# Patient Record
Sex: MALE | Race: WHITE | NOT HISPANIC OR LATINO | Employment: FULL TIME | URBAN - METROPOLITAN AREA
[De-identification: names, ages, dates, MRNs, and addresses within clinical notes are randomized per-mention and may not be internally consistent; named-entity substitution may affect disease eponyms.]

---

## 2024-06-21 ENCOUNTER — OFFICE VISIT (OUTPATIENT)
Dept: ENDOCRINOLOGY | Facility: CLINIC | Age: 74
End: 2024-06-21
Payer: COMMERCIAL

## 2024-06-21 VITALS
BODY MASS INDEX: 32.18 KG/M2 | DIASTOLIC BLOOD PRESSURE: 78 MMHG | SYSTOLIC BLOOD PRESSURE: 140 MMHG | WEIGHT: 205 LBS | HEART RATE: 58 BPM | HEIGHT: 67 IN

## 2024-06-21 DIAGNOSIS — E03.2 HYPOTHYROIDISM DUE TO MEDICATION: Primary | ICD-10-CM

## 2024-06-21 LAB
ANION GAP SERPL CALCULATED.3IONS-SCNC: 10 MMOL/L (ref 5–15)
BUN SERPL-MCNC: 28 MG/DL (ref 8–23)
BUN/CREAT SERPL: 26.4 (ref 7–25)
CALCIUM SPEC-SCNC: 9.2 MG/DL (ref 8.6–10.5)
CHLORIDE SERPL-SCNC: 102 MMOL/L (ref 98–107)
CO2 SERPL-SCNC: 27 MMOL/L (ref 22–29)
CREAT SERPL-MCNC: 1.06 MG/DL (ref 0.76–1.27)
EGFRCR SERPLBLD CKD-EPI 2021: 74.6 ML/MIN/1.73
GLUCOSE SERPL-MCNC: 90 MG/DL (ref 65–99)
POTASSIUM SERPL-SCNC: 3.9 MMOL/L (ref 3.5–5.2)
SODIUM SERPL-SCNC: 139 MMOL/L (ref 136–145)
T4 FREE SERPL-MCNC: 1.67 NG/DL (ref 0.92–1.68)
TSH SERPL DL<=0.05 MIU/L-ACNC: 3.12 UIU/ML (ref 0.27–4.2)

## 2024-06-21 PROCEDURE — 84439 ASSAY OF FREE THYROXINE: CPT | Performed by: INTERNAL MEDICINE

## 2024-06-21 PROCEDURE — 84443 ASSAY THYROID STIM HORMONE: CPT | Performed by: INTERNAL MEDICINE

## 2024-06-21 PROCEDURE — 80048 BASIC METABOLIC PNL TOTAL CA: CPT | Performed by: INTERNAL MEDICINE

## 2024-06-21 RX ORDER — AMIODARONE HYDROCHLORIDE 200 MG/1
1 TABLET ORAL 2 TIMES DAILY
COMMUNITY

## 2024-06-21 RX ORDER — LEVOFLOXACIN 750 MG/1
1 TABLET, FILM COATED ORAL DAILY
COMMUNITY
Start: 2024-05-27

## 2024-06-21 RX ORDER — BENZONATATE 200 MG/1
CAPSULE ORAL
COMMUNITY

## 2024-06-21 RX ORDER — CETIRIZINE HYDROCHLORIDE 10 MG/1
CAPSULE, LIQUID FILLED ORAL
COMMUNITY

## 2024-06-21 RX ORDER — LEVOTHYROXINE SODIUM 0.07 MG/1
75 TABLET ORAL
COMMUNITY

## 2024-06-21 RX ORDER — ASPIRIN 81 MG/1
81 TABLET ORAL DAILY
COMMUNITY

## 2024-06-21 RX ORDER — SACUBITRIL AND VALSARTAN 24; 26 MG/1; MG/1
1 TABLET, FILM COATED ORAL 2 TIMES DAILY
COMMUNITY

## 2024-06-21 RX ORDER — FUROSEMIDE 20 MG/1
TABLET ORAL
COMMUNITY

## 2024-06-21 RX ORDER — APIXABAN 5 MG/1
1 TABLET, FILM COATED ORAL 2 TIMES DAILY
COMMUNITY
Start: 2024-05-23

## 2024-06-21 RX ORDER — ALBUTEROL SULFATE 90 UG/1
AEROSOL, METERED RESPIRATORY (INHALATION)
COMMUNITY

## 2024-06-21 NOTE — PROGRESS NOTES
Subjective:   Hypothyroidism        Pierre Church is a 74 y.o. male who is being seen for consultation today at the request of HORACIO Martin   Hypothyroidism  , amiodarone induced dx in 09/2023. He had Afib/CVA in 09/2023 and started amiodarone.    Cardioablation completed in 11/2024, in sinsus rhythm since then  C/o leg swelling, joint pain, rashes, fatigue and weigh gain.   He started levothyroxine in November after he presented with dizziness, fatigue, swelling. TSH was 12  He started 25 mcg levothyroxine and the dose slowly increased to 75 mcg about 4-6 weeks ago by neurologist.   He feels that some of the symptoms improve after the dose increase     Current medications:  Current Outpatient Medications   Medication Sig Dispense Refill    albuterol sulfate  (90 Base) MCG/ACT inhaler INHALE 1 PUFF BY MOUTH EVERY 4-6 HOURS FOR 10 DAYS AS NEEDED      amiodarone (PACERONE) 200 MG tablet Take 1 tablet by mouth 2 (Two) Times a Day.      aspirin 81 MG EC tablet Take 1 tablet by mouth Daily.      benzonatate (TESSALON) 200 MG capsule TAKE 1 CAPSULE BY MOUTH THREE TIMES A DAY FOR 10 DAYS      Cetirizine HCl (ZyrTEC Allergy) 10 MG capsule Take 1 capsule twice a day by oral route.      Eliquis 5 MG tablet tablet Take 1 tablet by mouth 2 (Two) Times a Day.      Entresto 24-26 MG tablet Take 1 tablet by mouth 2 (Two) Times a Day.      furosemide (LASIX) 20 MG tablet TAKE 1 TABLET BY MOUTH UP TO TWICE A DAY AS NEEDED      levoFLOXacin (LEVAQUIN) 750 MG tablet Take 1 tablet by mouth Daily.      levothyroxine (SYNTHROID, LEVOTHROID) 75 MCG tablet Take 1 tablet by mouth Every Morning Before Breakfast.      metoprolol tartrate (LOPRESSOR) 25 MG tablet Take 1 tablet by mouth 2 (Two) Times a Day.       No current facility-administered medications for this visit.       Review of Systems  Review of Systems   Constitutional:  Positive for fatigue.   Respiratory:  Positive for shortness of breath.    Cardiovascular:   Positive for palpitations.   Neurological:  Positive for weakness.   Psychiatric/Behavioral:  Positive for decreased concentration.        Objective:     Vitals:    06/21/24 1344   BP: 140/78   Pulse: 58   Body mass index is 32.11 kg/m².  Physical Exam  Vitals reviewed.   Constitutional:       Appearance: Normal appearance.   Cardiovascular:      Rate and Rhythm: Normal rate and regular rhythm.      Pulses: Normal pulses.      Heart sounds: Normal heart sounds.   Pulmonary:      Effort: Pulmonary effort is normal.      Breath sounds: Normal breath sounds.   Musculoskeletal:         General: No swelling.   Neurological:      Mental Status: He is alert and oriented to person, place, and time.   Psychiatric:         Mood and Affect: Mood normal.         Thought Content: Thought content normal.         LABS AND IMAGING  Lab Results   Component Value Date    TSH 6.930 (H) 12/10/2023    FREET4 0.92 12/10/2023               Assessment:        Problem List Items Addressed This Visit    None  Visit Diagnoses       Hypothyroidism due to medication    -  Primary    Relevant Medications    levothyroxine (SYNTHROID, LEVOTHROID) 75 MCG tablet    metoprolol tartrate (LOPRESSOR) 25 MG tablet    Other Relevant Orders    T4, Free    TSH    Basic Metabolic Panel                  Plan:         Patient appears clinically hypothyroid, the onset is associated with amiodarone.   The dose was slowly increased to a current 75 mcg daily.   Repeat the levels and adjust the dose.   Patient has asked if [place] could be considered, I explained with hx of AFib and heart condition it is risky to take this medication.      Return in about 4 months (around 10/21/2024) for 15 min appointment.   The risks and benefits of my recommendations, as well as other treatment options were discussed with the patient today. Questions were answered.         Nallely Paredes MD

## 2024-06-25 ENCOUNTER — TELEPHONE (OUTPATIENT)
Dept: ENDOCRINOLOGY | Facility: CLINIC | Age: 74
End: 2024-06-25
Payer: COMMERCIAL

## 2024-06-25 NOTE — TELEPHONE ENCOUNTER
Caller: Pierre Church    Relationship to patient: Self    Best call back number: [phone]    Patient is needing: PT WOULD LIKE TO FIND OUT IF HE CAN DRINK A SPRITE THAT SAYS ZERO SUGAR IN IT OR SHOULD HE NOT DRINK IT ALL TOGETHER?? PLEASE CALL THE PT BACK.

## 2024-06-25 NOTE — TELEPHONE ENCOUNTER
Caller: RanjitPierre trujillo    Relationship: Self    Best call back number: [phone]    Requested Prescriptions: levothyroxine (SYNTHROID, LEVOTHROID) 75 MCG tablet   Requested Prescriptions      No prescriptions requested or ordered in this encounter        Pharmacy where request should be sent: [facility]- [address] [place] [address] [address] [place], [place]     Last office visit with prescribing clinician: 6/21/2024   Last telemedicine visit with prescribing clinician: Visit date not found   Next office visit with prescribing clinician: 10/11/2024     Additional details provided by patient: PT NEEDS A REFILL ON THIS MEDICATION.     Does the patient have less than a 3 day supply:  [] Yes  [x] No    Would you like a call back once the refill request has been completed: [x] Yes [] No    If the office needs to give you a call back, can they leave a voicemail: [x] Yes [] No    Miguel Mendez Rep   06/25/24 10:09 EDT

## 2024-06-26 RX ORDER — LEVOTHYROXINE SODIUM 0.07 MG/1
75 TABLET ORAL
Qty: 30 TABLET | Refills: 1 | Status: SHIPPED | OUTPATIENT
Start: 2024-06-26 | End: 2024-06-27 | Stop reason: SDUPTHER

## 2024-06-26 NOTE — TELEPHONE ENCOUNTER
Rx Refill Note  Requested Prescriptions      No prescriptions requested or ordered in this encounter      Last office visit with prescribing clinician: 6/21/2024   Last telemedicine visit with prescribing clinician: Visit date not found   Next office visit with prescribing clinician: 10/11/2024                         Would you like a call back once the refill request has been completed: [] Yes [] No    If the office needs to give you a call back, can they leave a voicemail: [] Yes [] No    Vicky Fine MA  06/26/24, 11:41 EDT

## 2024-06-27 ENCOUNTER — TELEPHONE (OUTPATIENT)
Dept: ENDOCRINOLOGY | Facility: CLINIC | Age: 74
End: 2024-06-27
Payer: COMMERCIAL

## 2024-06-27 NOTE — TELEPHONE ENCOUNTER
Rx Refill Note  Requested Prescriptions     Pending Prescriptions Disp Refills    levothyroxine (SYNTHROID, LEVOTHROID) 75 MCG tablet 30 tablet 1     Sig: Take 1 tablet by mouth Every Morning Before Breakfast.      Last office visit with prescribing clinician: 6/21/2024   Last telemedicine visit with prescribing clinician: Visit date not found   Next office visit with prescribing clinician: 10/11/2024                         Would you like a call back once the refill request has been completed: [] Yes [] No    If the office needs to give you a call back, can they leave a voicemail: [] Yes [] No    Vicky Fine MA  06/27/24, 16:00 EDT

## 2024-06-27 NOTE — TELEPHONE ENCOUNTER
Caller: Pierre Church    Relationship: Self    Best call back number: [phone]     Requested Prescriptions:   Requested Prescriptions      No prescriptions requested or ordered in this encounter    LEVOTHRYOXINE 90 DAY    Pharmacy where request should be sent:    [facility]  [place] [place]  Last office visit with prescribing clinician: 6/21/2024     Next office visit with prescribing clinician: 10/11/2024     Does the patient have less than a 3 day supply:  [x] Yes  [] No    Would you like a call back once the refill request has been completed: [x] Yes [] No    If the office needs to give you a call back, can they leave a voicemail: [x] Yes [] No    Miguel Nicole   06/27/24 15:35 EDT

## 2024-06-28 ENCOUNTER — TELEPHONE (OUTPATIENT)
Dept: ENDOCRINOLOGY | Facility: CLINIC | Age: 74
End: 2024-06-28
Payer: COMMERCIAL

## 2024-06-28 RX ORDER — LEVOTHYROXINE SODIUM 0.07 MG/1
75 TABLET ORAL
Qty: 30 TABLET | Refills: 1 | Status: SHIPPED | OUTPATIENT
Start: 2024-06-28 | End: 2024-06-28 | Stop reason: SDUPTHER

## 2024-06-28 NOTE — TELEPHONE ENCOUNTER
Rx Refill Note  Requested Prescriptions     Pending Prescriptions Disp Refills    levothyroxine (SYNTHROID, LEVOTHROID) 75 MCG tablet 90 tablet 1     Sig: Take 1 tablet by mouth Every Morning Before Breakfast.      Last office visit with prescribing clinician: 6/21/2024   Last telemedicine visit with prescribing clinician: Visit date not found   Next office visit with prescribing clinician: 10/11/2024                         Would you like a call back once the refill request has been completed: [] Yes [] No    If the office needs to give you a call back, can they leave a voicemail: [] Yes [] No    Vicky Fine MA  06/28/24, 15:50 EDT

## 2024-06-28 NOTE — TELEPHONE ENCOUNTER
PT'S DAUGHTER CALLED REQUESTING TO INCREASE THIS PT'S THYROID MEDICATION DUE TO HIM NOT FEELING WELL. SHE REQUESTED A CALL BACK TO CONSULT.

## 2024-06-28 NOTE — TELEPHONE ENCOUNTER
ALTERNATIVE REQUESTED  Drug: LEVOTHYROXINE 75 MCG TAB  Qty: 30  Directions: Take 1 tablet by mouth every day before breakfast  Date Written: 06/26/24  Pharmacy Comments: Medication needs to be dispensed as a 90 day supply per insurance requirements. Please provide additional refills if appropriate.     [facility]/pharmacy #[id number] - [place] [id number] [facility] [phone] [facility] [phone]

## 2024-07-01 RX ORDER — LEVOTHYROXINE SODIUM 0.07 MG/1
75 TABLET ORAL
Qty: 90 TABLET | Refills: 1 | Status: SHIPPED | OUTPATIENT
Start: 2024-07-01

## 2024-07-01 NOTE — TELEPHONE ENCOUNTER
Called and spoke with pt daughter and she thinks if he is tweaked just a little he might feel better. I let her know that his labs are normal

## 2024-07-18 ENCOUNTER — APPOINTMENT (OUTPATIENT)
Dept: GENERAL RADIOLOGY | Facility: HOSPITAL | Age: 74
End: 2024-07-18
Payer: COMMERCIAL

## 2024-07-18 ENCOUNTER — APPOINTMENT (OUTPATIENT)
Dept: NEUROLOGY | Facility: HOSPITAL | Age: 74
End: 2024-07-18
Payer: COMMERCIAL

## 2024-07-18 ENCOUNTER — DOCUMENTATION (OUTPATIENT)
Dept: CARDIOLOGY | Facility: CLINIC | Age: 74
End: 2024-07-18
Payer: COMMERCIAL

## 2024-07-18 ENCOUNTER — APPOINTMENT (OUTPATIENT)
Dept: CT IMAGING | Facility: HOSPITAL | Age: 74
End: 2024-07-18
Payer: COMMERCIAL

## 2024-07-18 ENCOUNTER — HOSPITAL ENCOUNTER (INPATIENT)
Facility: HOSPITAL | Age: 74
LOS: 2 days | Discharge: HOME | End: 2024-07-20
Attending: INTERNAL MEDICINE | Admitting: INTERNAL MEDICINE
Payer: COMMERCIAL

## 2024-07-18 DIAGNOSIS — R41.841 COGNITIVE COMMUNICATION DEFICIT: Primary | ICD-10-CM

## 2024-07-18 PROBLEM — I63.9 ACUTE CVA (CEREBROVASCULAR ACCIDENT): Status: ACTIVE | Noted: 2024-07-18

## 2024-07-18 PROBLEM — G93.40 ACUTE ENCEPHALOPATHY: Status: ACTIVE | Noted: 2024-07-18

## 2024-07-18 LAB
AMPHET+METHAMPHET UR QL: NEGATIVE
AMPHETAMINES UR QL: NEGATIVE
BARBITURATES UR QL SCN: NEGATIVE
BENZODIAZ UR QL SCN: NEGATIVE
BILIRUB UR QL STRIP: NEGATIVE
BUPRENORPHINE SERPL-MCNC: NEGATIVE NG/ML
CANNABINOIDS SERPL QL: NEGATIVE
CLARITY UR: CLEAR
COCAINE UR QL: NEGATIVE
COLOR UR: YELLOW
FENTANYL UR-MCNC: NEGATIVE NG/ML
GLUCOSE BLDC GLUCOMTR-MCNC: 122 MG/DL (ref 70–130)
GLUCOSE BLDC GLUCOMTR-MCNC: 98 MG/DL (ref 70–130)
GLUCOSE UR STRIP-MCNC: NEGATIVE MG/DL
HGB UR QL STRIP.AUTO: NEGATIVE
KETONES UR QL STRIP: NEGATIVE
LEUKOCYTE ESTERASE UR QL STRIP.AUTO: NEGATIVE
METHADONE UR QL SCN: NEGATIVE
NITRITE UR QL STRIP: NEGATIVE
OPIATES UR QL: NEGATIVE
OXYCODONE UR QL SCN: NEGATIVE
PCP UR QL SCN: NEGATIVE
PH UR STRIP.AUTO: 7.5 [PH] (ref 5–8)
POTASSIUM SERPL-SCNC: 3.8 MMOL/L (ref 3.5–5.2)
PROT UR QL STRIP: NEGATIVE
SP GR UR STRIP: 1.04 (ref 1–1.03)
TRICYCLICS UR QL SCN: NEGATIVE
TSH SERPL DL<=0.05 MIU/L-ACNC: 2.7 UIU/ML (ref 0.27–4.2)
UROBILINOGEN UR QL STRIP: ABNORMAL

## 2024-07-18 PROCEDURE — 70496 CT ANGIOGRAPHY HEAD: CPT

## 2024-07-18 PROCEDURE — 99222 1ST HOSP IP/OBS MODERATE 55: CPT | Performed by: NURSE PRACTITIONER

## 2024-07-18 PROCEDURE — 25510000001 IOPAMIDOL PER 1 ML: Performed by: INTERNAL MEDICINE

## 2024-07-18 PROCEDURE — 81003 URINALYSIS AUTO W/O SCOPE: CPT | Performed by: INTERNAL MEDICINE

## 2024-07-18 PROCEDURE — 82948 REAGENT STRIP/BLOOD GLUCOSE: CPT

## 2024-07-18 PROCEDURE — 80307 DRUG TEST PRSMV CHEM ANLYZR: CPT | Performed by: INTERNAL MEDICINE

## 2024-07-18 PROCEDURE — 93010 ELECTROCARDIOGRAM REPORT: CPT | Performed by: INTERNAL MEDICINE

## 2024-07-18 PROCEDURE — 99222 1ST HOSP IP/OBS MODERATE 55: CPT | Performed by: INTERNAL MEDICINE

## 2024-07-18 PROCEDURE — 71045 X-RAY EXAM CHEST 1 VIEW: CPT

## 2024-07-18 PROCEDURE — 95819 EEG AWAKE AND ASLEEP: CPT

## 2024-07-18 PROCEDURE — 70450 CT HEAD/BRAIN W/O DYE: CPT

## 2024-07-18 PROCEDURE — 4A03X5D MEASUREMENT OF ARTERIAL FLOW, INTRACRANIAL, EXTERNAL APPROACH: ICD-10-PCS | Performed by: RADIOLOGY

## 2024-07-18 PROCEDURE — 84443 ASSAY THYROID STIM HORMONE: CPT | Performed by: INTERNAL MEDICINE

## 2024-07-18 PROCEDURE — 95819 EEG AWAKE AND ASLEEP: CPT | Performed by: PSYCHIATRY & NEUROLOGY

## 2024-07-18 PROCEDURE — 84132 ASSAY OF SERUM POTASSIUM: CPT | Performed by: INTERNAL MEDICINE

## 2024-07-18 PROCEDURE — 92523 SPEECH SOUND LANG COMPREHEN: CPT

## 2024-07-18 PROCEDURE — 70498 CT ANGIOGRAPHY NECK: CPT

## 2024-07-18 PROCEDURE — 0042T HC CT CEREBRAL PERFUSION W/WO CONTRAST: CPT

## 2024-07-18 PROCEDURE — 93005 ELECTROCARDIOGRAM TRACING: CPT | Performed by: INTERNAL MEDICINE

## 2024-07-18 PROCEDURE — 25010000002 LEVETRIRACETAM PER 10 MG: Performed by: NURSE PRACTITIONER

## 2024-07-18 RX ORDER — MONTELUKAST SODIUM 10 MG/1
10 TABLET ORAL 2 TIMES DAILY
COMMUNITY

## 2024-07-18 RX ORDER — FLUTICASONE PROPIONATE 50 MCG
2 SPRAY, SUSPENSION (ML) NASAL DAILY
COMMUNITY

## 2024-07-18 RX ORDER — SODIUM CHLORIDE 0.9 % (FLUSH) 0.9 %
10 SYRINGE (ML) INJECTION AS NEEDED
Status: DISCONTINUED | OUTPATIENT
Start: 2024-07-18 | End: 2024-07-20 | Stop reason: HOSPADM

## 2024-07-18 RX ORDER — ASPIRIN 300 MG/1
300 SUPPOSITORY RECTAL DAILY
Status: DISCONTINUED | OUTPATIENT
Start: 2024-07-18 | End: 2024-07-20 | Stop reason: HOSPADM

## 2024-07-18 RX ORDER — AMOXICILLIN 500 MG
1200 CAPSULE ORAL DAILY
COMMUNITY
End: 2024-07-20 | Stop reason: HOSPADM

## 2024-07-18 RX ORDER — LEVETIRACETAM 500 MG/5ML
1000 INJECTION, SOLUTION, CONCENTRATE INTRAVENOUS ONCE
Status: COMPLETED | OUTPATIENT
Start: 2024-07-18 | End: 2024-07-18

## 2024-07-18 RX ORDER — SODIUM CHLORIDE 9 MG/ML
40 INJECTION, SOLUTION INTRAVENOUS AS NEEDED
Status: DISCONTINUED | OUTPATIENT
Start: 2024-07-18 | End: 2024-07-20 | Stop reason: HOSPADM

## 2024-07-18 RX ORDER — ASPIRIN 81 MG/1
81 TABLET, CHEWABLE ORAL DAILY
Status: DISCONTINUED | OUTPATIENT
Start: 2024-07-18 | End: 2024-07-20 | Stop reason: HOSPADM

## 2024-07-18 RX ORDER — ATORVASTATIN CALCIUM 40 MG/1
80 TABLET, FILM COATED ORAL NIGHTLY
Status: DISCONTINUED | OUTPATIENT
Start: 2024-07-18 | End: 2024-07-20 | Stop reason: HOSPADM

## 2024-07-18 RX ORDER — SODIUM CHLORIDE 0.9 % (FLUSH) 0.9 %
10 SYRINGE (ML) INJECTION EVERY 12 HOURS SCHEDULED
Status: DISCONTINUED | OUTPATIENT
Start: 2024-07-18 | End: 2024-07-20 | Stop reason: HOSPADM

## 2024-07-18 RX ORDER — LEVETIRACETAM 500 MG/5ML
500 INJECTION, SOLUTION, CONCENTRATE INTRAVENOUS EVERY 12 HOURS SCHEDULED
Status: DISCONTINUED | OUTPATIENT
Start: 2024-07-18 | End: 2024-07-20 | Stop reason: HOSPADM

## 2024-07-18 RX ADMIN — ATORVASTATIN CALCIUM 80 MG: 40 TABLET, FILM COATED ORAL at 20:46

## 2024-07-18 RX ADMIN — Medication 10 ML: at 20:47

## 2024-07-18 RX ADMIN — IOPAMIDOL 120 ML: 755 INJECTION, SOLUTION INTRAVENOUS at 13:21

## 2024-07-18 RX ADMIN — Medication 10 ML: at 15:57

## 2024-07-18 RX ADMIN — LEVETIRACETAM 500 MG: 100 INJECTION, SOLUTION INTRAVENOUS at 20:47

## 2024-07-18 RX ADMIN — APIXABAN 5 MG: 5 TABLET, FILM COATED ORAL at 20:46

## 2024-07-18 RX ADMIN — LEVETIRACETAM 1000 MG: 100 INJECTION, SOLUTION INTRAVENOUS at 15:56

## 2024-07-18 NOTE — PROGRESS NOTES
Medtronic defibrillator [id number] Pedro is not MRI compatible.  RA lead 5076-52, RV 6947M are compatible.

## 2024-07-18 NOTE — THERAPY EVALUATION
Acute Care - Speech Language Pathology   Initial Evaluation  [facility]  Cognitive-Communication Evaluation       Patient Name: Pierre Church  : 1950  MRN: [id number]  Today's Date: 2024               Admit Date: 2024     Visit Dx:    ICD-10-CM ICD-9-CM   1. Cognitive communication deficit  R41.841 799.52     Patient Active Problem List   Diagnosis    Acute CVA (cerebrovascular accident)    Acute encephalopathy     Past Medical History:   Diagnosis Date    Arthritis     CHF (congestive heart failure)     Coronary artery disease     Disease of thyroid gland     Elevated cholesterol     Hypertension     Stroke      Past Surgical History:   Procedure Laterality Date    CARDIAC CATHETERIZATION      CARDIAC SURGERY      CORONARY ARTERY BYPASS GRAFT      HERNIA REPAIR         SLP Recommendation and Plan  SLP Diagnosis: functional speech/language skills, mild, cognitive-linguistic disorder (24)              [facility] Criteria for Skilled Therapy Interventions Met: yes (24)  Anticipated Discharge Disposition (SLP): home with OP services, anticipate therapy at next level of care (24)        Therapy Frequency (SLP SLC): 5 days per week (24)  Predicted Duration Therapy Intervention (Days): 1 week (24)                                    SLP EVALUATION (Last 72 Hours)       SLP SLC Evaluation       Row Name 24                   Communication Assessment/Intervention    Document Type evaluation  -CH        Subjective Information no complaints  -CH        Patient Observations alert;cooperative;agree to therapy  -CH        Patient/Family/Caregiver Comments/Observations family present, patient emotionally labile per family report  -CH        Patient Effort good  -CH        Symptoms Noted During/After Treatment none  -CH           General Information    Patient Profile Reviewed yes  -CH        Pertinent History Of Current Problem Hx prior CVA with a  right PCA stroke in September 2023 with hemorrhagic conversion, A-fib, MI, CABG, hypertension and dyslipidemia.  Admitted with confusion/AMS, then developed left-sided weakness involving face arm and leg as well as facial droop and slurred speech.  -        Precautions/Limitations, Vision WFL;for purposes of eval  -CH        Precautions/Limitations, Hearing WFL;for purposes of eval  -CH        Prior Level of Function-Communication WFL  -        Plans/Goals Discussed with patient and family;agreed upon  -        Barriers to Rehab none identified  -        Patient's Goals for Discharge patient did not state  -        Family Goals for Discharge patient able to return to previous activities/roles  -           Pain    Additional Documentation Pain Scale: FACES Pre/Post-Treatment (Group)  -           Pain Scale: FACES Pre/Post-Treatment    Pain: FACES Scale, Pretreatment 0-->no hurt  -        Posttreatment Pain Rating 0-->no hurt  -           Comprehension Assessment/Intervention    Comprehension Assessment/Intervention Auditory Comprehension;Reading Comprehension  -           Auditory Comprehension Assessment/Intervention    Auditory Comprehension (Communication) WNL  -CH           Reading Comprehension Assessment/Intervention    Reading Comprehension (Communication) WNL  -           Expression Assessment/Intervention    Expression Assessment/Intervention verbal expression  -           Verbal Expression Assessment/Intervention    Verbal Expression WNL  -        Automatic Speech (Communication) response to greeting;WNL  -CH        Repetition sentences;WNL  -CH        Responsive Naming complex;WNL  -CH        Confrontational Naming low frequency;WNL  -CH        Spontaneous/Functional Words complex;WNL  -CH        Sentence Formulation complex;WNL  -CH        Conversational Discourse/Fluency WNL  -           Oral Motor Structure and Function    Oral Motor Structure and Function WFL  -CH            Oral Musculature and Cranial Nerve Assessment    Oral Motor General Assessment WFL  -           Motor Speech Assessment/Intervention    Motor Speech Function WNL  -           Cursory Voice Assessment/Intervention    Quality and Resonance (Voice) WNL  -           Cognitive Assessment Intervention- SLP    Cognitive Function (Cognition) mild impairment  -        Orientation Status (Cognition) awareness of basic personal information;person;WFL;place;time;situation;mild impairment  -        Memory (Cognitive) immediate;simple;long-term;WFL;short-term;delayed;mental manipulation;mild impairment  -        Attention (Cognitive) selective;sustained;attention to detail;[facility]  -        Thought Organization (Cognitive) concrete divergent;abstract divergent;concrete convergent;abstract convergent;[facility]  -        Reasoning (Cognitive) simple;deductive;[facility]  -        Problem Solving (Cognitive) simple;temporal;[facility]  -        Functional Math (Cognitive) simple;word problems;[facility]  -        Pragmatics (Communication) [facility]  -           SLP Evaluation Clinical Impressions    SLP Diagnosis functional speech/language skills;mild;cognitive-linguistic disorder  -        Rehab Potential/Prognosis good  -        SLC Criteria for Skilled Therapy Interventions Met yes  -           Recommendations    Therapy Frequency (SLP SLC) 5 days per week  -        Predicted Duration Therapy Intervention (Days) 1 week  -        Anticipated Discharge Disposition (SLP) home with OP services;anticipate therapy at next level of care  -                  User Key  (r) = Recorded By, (t) = Taken By, (c) = Cosigned By      Initials Name Effective Dates    Dianna Walker, MS CCC-SLP 06/16/21 -                        EDUCATION  The patient has been educated in the following areas:     Cognitive Impairment Communication Impairment.           SLP GOALS       Row Name 07/18/24 1728             Patient will demonstrate functional  cognitive-linguistic skills for return to discharge environment    [place] with minimal cues  -      Time frame by discharge  -      Progress/Outcomes new goal  -         Awareness of Basic Personal Information Goal 1 (SLP)    Improve Awareness of Basic Personal Information Goal 1 (SLP) answering open-ended questions regarding personal/biographical information;80%;with minimal cues (75-90%)  -      Time Frame (Awareness of Basic Personal Information Goal 1, SLP) 1 week  -      Progress/Outcomes (Awareness of Basic Personal Information Goal 1, SLP) new goal  -         Orientation Goal 1 (SLP)    Improve Orientation Through Goal 1 (SLP) demonstrating orientation to day;demonstrating orientation to month;demonstrating orientation to year;demonstrating orientation to place;demonstrating orientation to disease/impairment;use environmental aids to assist with orientation;90%;with minimal cues (75-90%)  -      Time Frame (Orientation Goal 1, SLP) 1 week  -      Progress/Outcomes (Orientation Goal 1, SLP) new goal  -         Memory Skills Goal 1 (SLP)    Improve Memory Skills Through Goal 1 (SLP) recalling related word lists immediately;recalling related word lists with an imposed delay;recall details from a word list;repeat list in sequential order;recall details of the day;80%;with minimal cues (75-90%)  -      Time Frame (Memory Skills Goal 1, SLP) 1 week  -      Progress/Outcomes (Memory Skills Goal 1, SLP) new goal  -                User Key  (r) = Recorded By, (t) = Taken By, (c) = Cosigned By      Initials Name Provider Type    Dianna Walker MS CCC-SLP Speech and Language Pathologist                              Time Calculation:      Time Calculation- SLP       Row Name 07/18/24 8318             Time Calculation- SLP    SLP Start Time 1430  -      SLP Received On 07/18/24  -         Untimed Charges    SLP Eval/Re-eval  ST Eval Speech and Production w/ Language - 19128  -       [id number] Eval Speech and Production w/ Language Minutes 48  -CH         Total Minutes    Untimed Charges Total Minutes 48  -CH       Total Minutes 48  -CH                User Key  (r) = Recorded By, (t) = Taken By, (c) = Cosigned By      Initials Name Provider Type    Dianna Walker MS CCC-SLP Speech and Language Pathologist                    Therapy Charges for Today       Code Description Service Date Service Provider Modifiers Qty    [id number] HC ST EVAL SPEECH AND PROD W LANG  3 7/18/2024 Dianna Duarte MS CCC-SLP GN 1                       Dianna Duarte MS CCC-SLP  7/18/2024

## 2024-07-18 NOTE — PLAN OF CARE
Goal Outcome Evaluation:  Plan of Care Reviewed With: patient, family                  Anticipated Discharge Disposition (SLP): home with OP services, anticipate therapy at next level of care    SLP Diagnosis: functional speech/language skills, mild, cognitive-linguistic disorder (07/18/24 7290)

## 2024-07-18 NOTE — CONSULTS
Stroke Consult Note    Patient Name: Pierre Church   MRN: [id number]  Age: 74 y.o.  Sex: male  : 1950    Primary Care Physician: Josesito Trejo MD  Referring Physician: Dr. Dai      Handedness: Right  Race:     Chief Complaint/Reason for Consultation: Left-sided weakness, left facial droop, dysarthria    Subjective .  HPI: Pierre Chruch is a 74-year-old,  male with known diagnosis of prior CVA (right PCA stroke, 2023; s/p thrombolytics with hemorrhagic conversion, residual left hemianopsia), A-fib (on Eliquis), MI, CABG () AICD, HTN, and HLD who presents as a transfer from [facility].  Patient had a sudden onset of altered mental status beginning at about 1045 this morning.  His wife stated that he was previously at his baseline and then he started this repetitively asking what day it was.  She denies any abnormal movements or other complaints.  He was initially just confused when she brought him to the emergency department but he then developed left-sided weakness involving the face, arm and leg as well as facial droop, sensory loss and slurred speech.  Per OSH ED provider NIHSS estimated at an 8.  [facility] was without CT capabilities; patient was transferred to [facility] for further stroke evaluation and possible LVO.    Upon arrival to [facility] patient escorted directly to CT scan.  CT head is negative for acute abnormalities, he has a chronic right PCA infarct, no evidence of hemorrhage.  CT perfusion is negative.  CTA head and neck negative for flow-limiting stenosis, no LVO.  Patient remains somewhat altered.  He is repetitively asking questions unable to tell me his age the month or his location.  He states he does not remember the events of this morning.  Gaze is normal, he does have some nystagmus with left lateral gaze, he has baseline left hemianopsia.  Face is symmetrical, no drift in any extremity.  Sensation is equal to light  touch bilaterally no dysmetria with finger-to-nose.  Patient did have an episode of vomiting when transferring to his room from CT scan.  He has no seizure history.    Last Known Normal Date/Time: 1045 EST     Review of Systems   Constitutional:  Positive for fatigue. Negative for fever.   Eyes:  Positive for visual disturbance.   Respiratory:  Negative for cough and shortness of breath.    Cardiovascular:  Negative for chest pain and palpitations.   Gastrointestinal:  Positive for nausea and vomiting.   Skin: Negative.    Neurological:  Negative for facial asymmetry, speech difficulty, weakness, numbness and headaches.   Psychiatric/Behavioral:  Positive for confusion.       No past medical history on file.  No past surgical history on file.  No family history on file.  Social History     Socioeconomic History    Marital status:    Tobacco Use    Smoking status: Never     Passive exposure: Never   Vaping Use    Vaping status: Never Used   Substance and Sexual Activity    Alcohol use: Never    Drug use: Defer    Sexual activity: Defer     Allergies   Allergen Reactions    Sulfate Rash     Prior to Admission medications    Medication Sig Start Date End Date Taking? Authorizing Provider   albuterol sulfate  (90 Base) MCG/ACT inhaler INHALE 1 PUFF BY MOUTH EVERY 4-6 HOURS FOR 10 DAYS AS NEEDED    Ru Zamora MD   amiodarone (PACERONE) 200 MG tablet Take 1 tablet by mouth 2 (Two) Times a Day.    Ru Zamora MD   aspirin 81 MG EC tablet Take 1 tablet by mouth Daily.    Ru Zamora MD   benzonatate (TESSALON) 200 MG capsule TAKE 1 CAPSULE BY MOUTH THREE TIMES A DAY FOR 10 DAYS    Ru Zamora MD   Cetirizine HCl (ZyrTEC Allergy) 10 MG capsule Take 1 capsule twice a day by oral route.    Ru Zamora MD   Eliquis 5 MG tablet tablet Take 1 tablet by mouth 2 (Two) Times a Day. 5/23/24   Ru Zamora MD   Entresto 24-26 MG tablet Take 1 tablet by mouth 2  (Two) Times a Day.    Ru Zamora MD   furosemide (LASIX) 20 MG tablet TAKE 1 TABLET BY MOUTH UP TO TWICE A DAY AS NEEDED    Ru Zamora MD   levoFLOXacin (LEVAQUIN) 750 MG tablet Take 1 tablet by mouth Daily. 5/27/24   Ru Zamora MD   levothyroxine (SYNTHROID, LEVOTHROID) 75 MCG tablet Take 1 tablet by mouth Every Morning Before Breakfast. 7/1/24   Nallely Paredes MD   metoprolol tartrate (LOPRESSOR) 25 MG tablet Take 1 tablet by mouth 2 (Two) Times a Day. 12/27/23   Ru Zamora MD             Objective     Heart Rate:  [70-73] 70  BP: (111-159)/() 111/79  Neurological Exam  Mental Status  Alert. Oriented only to person. Unable to state age, month, or location. Speech is normal. Language is fluent with no aphasia.    Cranial Nerves  CN II: Left homonymous hemianopsia.  CN III, IV, VI: Nystagmus present: Normal lids and orbits bilaterally. Pupils equal round and reactive to light bilaterally.  CN V: Facial sensation is normal.  CN VII: Full and symmetric facial movement.  CN XII: Tongue midline without atrophy or fasciculations.    Motor  Normal muscle bulk throughout. No fasciculations present. Normal muscle tone. No abnormal involuntary movements. Strength is 5/5 throughout all four extremities.    Sensory  Light touch is normal in upper and lower extremities.     Coordination  Right: Finger-to-nose normal. Heel-to-shin normal.Left: Finger-to-nose normal. Heel-to-shin normal.    Gait    Not tested.      Physical Exam  Vitals reviewed.   Constitutional:       Appearance: Normal appearance.   HENT:      Head: Normocephalic and atraumatic.   Eyes:      General: Lids are normal. Visual field deficit present.      Extraocular Movements: Nystagmus present.      Pupils: Pupils are equal, round, and reactive to light.   Cardiovascular:      Rate and Rhythm: Normal rate.   Pulmonary:      Effort: Pulmonary effort is normal. No respiratory distress.   Musculoskeletal:          General: No swelling. Normal range of motion.      Cervical back: Normal range of motion and neck supple.   Skin:     General: Skin is warm and dry.   Neurological:      Mental Status: He is alert. He is disoriented.      Cranial Nerves: Cranial nerve deficit present.      Sensory: No sensory deficit.      Motor: Motor strength is normal.No weakness.      Coordination: Coordination normal.   Psychiatric:         Mood and Affect: Mood normal.         Speech: Speech normal.         Behavior: Behavior normal.         Acute Stroke Data    IV Thrombolytic (TPA/Tenecteplase) Inclusion / Exclusion Criteria    Time: 14:57 EDT  Person Administering Scale: HORACIO Pryor    Inclusion Criteria  [x]   18 years of age or greater   [x]   Onset of symptoms < 4.5 hours before beginning treatment (stroke onset = time patient was last seen well or without symptoms).   []   Diagnosis of acute ischemic stroke causing measurable disabling deficit (Complete Hemianopia, Any Aphasia, Visual or Sensory Extinction, Any weakness limiting sustained effort against gravity)   []   Any remaining deficit considered potentially disabling in view of patient and practitioner   Exclusion criteria (Do not proceed with Alteplase if any are checked under exclusion criteria)  []   Onset unknown or GREATER than 4.5 hours   []   ICH on CT/MRI   []   CT demonstrates hypodensity representing acute or subacute infarct   []   Significant head trauma or prior stroke in the previous 3 months   []   Symptoms suggestive of subarachnoid hemorrhage   []   History of un-ruptured intracranial aneurysm GREATER than 10 mm   []   Recent intracranial or intraspinal surgery within the last 3 months   []   Arterial puncture at a non-compressible site in the previous 7 days   []   Active internal bleeding   []   Acute bleeding tendency   []   Platelet count LESS than 100,000 for known hematological diseases such as leukemia, thrombocytopenia or chronic cirrhosis   []    Current use of anticoagulant with INR GREATER than 1.7 or PT GREATER than 15 seconds, aPTT GREATER than 40 seconds   []   Heparin received within 48 hours, resulting in abnormally elevated aPTT GREATER than upper limit of normal   []   Current use of direct thrombin inhibitors or direct factor Xa inhibitors in the past 48 hours   []   Elevated blood pressure refractory to treatment (systolic GREATER than 185 mm/Hg or diastolic  GREATER than 110 mm/Hg   []   Suspected infective endocarditis and aortic arch dissection   []   Current use of therapeutic treatment dose of low-molecular-weight heparin (LMWH) within the previous 24 hours   []   Structural GI malignancy or bleed   Relative exclusion for all patients  [x]   Only minor nondisabling symptoms   []   Pregnancy   []   Seizure at onset with postictal residual neurological impairments   []   Major surgery or previous trauma within past 14 days   []   History of previous spontaneous ICH, intracranial neoplasm, or AV malformation   []   Postpartum (within previous 14 days)   []   Recent GI or urinary tract hemorrhage (within previous 21 days)   []   Recent acute MI (within previous 3 months)   []   History of unruptured intracranial aneurysm LESS than 10 mm   []   History of ruptured intracranial aneurysm   []   Blood glucose LESS than 50 mg/dL (2.7 mmol/L)   []   Dural puncture within the last 7 days   []   Known GREATER than 10 cerebral microbleeds   Additional exclusions for patients with symptoms onset between 3 and 4.5 hours.  []   Age > 80.   []   On any anticoagulants regardless of INR  >>> Warfarin (Coumadin), Heparin, Enoxaparin (Lovenox), fondaparinux (Arixtra), bivalirudin (Angiomax), Argatroban, dabigatran (Pradaxa), rivaroxaban (Xarelto), or apixaban (Eliquis)   []   Severe stroke (NIHSS > 25).   []   History of BOTH diabetes and previous ischemic stroke.   []   The risks and benefits have been discussed with the patient or family related to the  administration of IV alteplase for stroke symptoms.   []   I have discussed and reviewed the patient's case and imaging with the attending prior to IV Thrombolytic (TPA/Tenecteplase).    Time Thrombolytic administered   History of hemorrhagic conversion from thrombolytics.  Symptoms have largely resolved    Hospital Meds:  Scheduled- aspirin, 81 mg, Oral, Daily   Or  aspirin, 300 mg, Rectal, Daily  atorvastatin, 80 mg, Oral, Nightly  sodium chloride, 10 mL, Intravenous, Q12H      Infusions-     PRNs-   sodium chloride    sodium chloride    Functional Status Prior to Current Stroke/Comer Score: MRS 0    NIH Stroke Scale  Time: 14:57 EDT  Person Administering Scale: HORACIO Pryor    1a  Level of consciousness: 0=alert; keenly responsive   1b. LOC questions:  2=Performs neither task correctly   1c. LOC commands: 0=Performs both tasks correctly   2.  Best Gaze: 0=normal   3.  Visual: 2=Complete hemianopia   4. Facial Palsy: 0=Normal symmetric movement   5a.  Motor left arm: 0=No drift, limb holds 90 (or 45) degrees for full 10 seconds   5b.  Motor right arm: 0=No drift, limb holds 90 (or 45) degrees for full 10 seconds   6a. motor left le=No drift, limb holds 90 (or 45) degrees for full 10 seconds   6b  Motor right le=No drift, limb holds 90 (or 45) degrees for full 10 seconds   7. Limb Ataxia: 0=Absent   8.  Sensory: 0=Normal; no sensory loss   9. Best Language:  0=No aphasia, normal   10. Dysarthria: 0=Normal   11. Extinction and Inattention: 1=Visual, tactile, auditory, spatial or personal inattention or extinction to bilateral simultaneous stimulation in one of the sensory modalities    Total:   5       Results Reviewed:  I have personally reviewed current lab, radiology, and data and agree with results.  CT Angiogram Head w AI Analysis of LVO    Result Date: 2024   1. Patent bilateral carotid and vertebral arteries with no significant stenosis or dissection 2. No evidence of acute  intracranial large vessel arterial occlusion  This report was finalized on 7/18/2024 2:46 PM by Ahsan Ramos.      CT Angiogram Neck    Result Date: 7/18/2024   1. Patent bilateral carotid and vertebral arteries with no significant stenosis or dissection 2. No evidence of acute intracranial large vessel arterial occlusion  This report was finalized on 7/18/2024 2:46 PM by Ahsan Ramos.      CT CEREBRAL PERFUSION WITH & WITHOUT CONTRAST    Result Date: 7/18/2024  No evidence of ischemic brain tissue at risk or core infarct  This report was finalized on 7/18/2024 2:25 PM by Ahsan Ramos.      CT Head Without Contrast    Result Date: 7/18/2024   1. No intracranial hemorrhage 2. Subacute to chronic right PCA distribution infarct  These results were communicated to the stroke team navigator at 1:25 p.m.  This report was finalized on 7/18/2024 1:25 PM by Ahsan Ramos.            Assessment/Plan:  74-year-old,  male with known diagnosis of prior CVA (right PCA stroke, September 2023; s/p thrombolytics with hemorrhagic conversion, residual left hemianopsia), A-fib (on Eliquis), MI, CABG (2014) AICD, HTN, and HLD who presents as a transfer from [facility].  Patient had a sudden onset of altered mental status beginning at about 1045.  At OSH he developed left-sided weakness involving the face, arm and leg as well as facial droop, sensory loss and slurred speech.  Per OSH ED provider NIHSS estimated at an 8.  [facility] was without CT capabilities; patient was transferred to [facility] for further stroke evaluation and possible LVO.    CT head is negative for acute abnormalities; chronic right PCA infarct, no evidence of hemorrhage.    CT perfusion is negative.    CTA head and neck negative for flow-limiting stenosis, no LVO.      PTA antiplatelet: Aspirin 81  PTA anticoagulant: Eliquis 5 mg twice daily        AMS        Transient left-sided weakness - resolved        History of right PCA  stroke - s/p thrombolytics with hemorrhagic conversion    -Questionable posterior circulation stroke versus TIA versus seizure  -TIA/ischemic stroke order set without thrombolytic therapy  -Unable to obtain MRI d/t incompatible AICD and leads  -Repeat CT head tomorrow afternoon  -Okay to continue Eliquis 5 mg twice daily  -Continue aspirin 81 mg  -Atorvastatin 80 mg daily  -TTE with bubble  -A1c and lipid panel  -Allow autoregulation of blood pressure for now, SBP<220  -PT/OT, eval in a.m.  -Keep bedrest for today, okay for OOB tomorrow  -Cardiac diet if passes bedside stroke dysphagia screen  -EEG  -Will load with Keppra 1 g followed by 500 mg twice daily  -Seizure precautions        -Stroke neurology will continue to follow        Zenia Marroquin, APRN  July 18, 2024  13:42 EDT

## 2024-07-18 NOTE — H&P
Intensive Care Admission Note     Left-sided weakness, facial droop and dysarthria    History of Present Illness     74-year-old male with diagnosis of prior CVA with a right PCA stroke in September 2023 at that time received thrombolytics which  was complicated with hemorrhagic conversion, A-fib on Eliquis, MI, CABG, hypertension and dyslipidemia.  Patient was brought to [place] emergency room due to concern about change in mental status.  Family did not notice any seizure-like activity but due to confusion he was brought to the emergency room.  Patient then developed left-sided weakness involving face arm and leg as well as facial droop and slurred speech.  NIH stroke scale was 8 at that time.  There were no CT capabilities so patient was transferred to [facility] for further evaluation and management.  Patient on arrival underwent CT scan of the head, CT angiogram and CT perfusion.  No definite evidence of CVA noted.  CT perfusion study was normal.  He states that he is back to his baseline now but does not recall what happened earlier today.  Denies any headaches.  Denies any vision changes other than baseline. patient apparently had episode of vomiting in the CT scanner.  Patient denies any ongoing chest pain or shortness of breath.  No palpitations.    Hemodynamically stable.  No fevers noted.    Patient denies any history of smoking.  Denies any alcohol use or any illicit drug use.    Problem List, Surgical History, Family, Social History, and ROS   No past medical history on file.   No past surgical history on file.    Allergies   Allergen Reactions    Sulfate Rash     No current facility-administered medications on file prior to encounter.     Current Outpatient Medications on File Prior to Encounter   Medication Sig    albuterol sulfate  (90 Base) MCG/ACT inhaler INHALE 1 PUFF BY MOUTH EVERY 4-6 HOURS FOR 10 DAYS AS NEEDED    amiodarone (PACERONE) 200 MG tablet Take 1 tablet by mouth 2  (Two) Times a Day.    aspirin 81 MG EC tablet Take 1 tablet by mouth Daily.    benzonatate (TESSALON) 200 MG capsule TAKE 1 CAPSULE BY MOUTH THREE TIMES A DAY FOR 10 DAYS    Cetirizine HCl (ZyrTEC Allergy) 10 MG capsule Take 1 capsule twice a day by oral route.    Eliquis 5 MG tablet tablet Take 1 tablet by mouth 2 (Two) Times a Day.    Entresto 24-26 MG tablet Take 1 tablet by mouth 2 (Two) Times a Day.    furosemide (LASIX) 20 MG tablet TAKE 1 TABLET BY MOUTH UP TO TWICE A DAY AS NEEDED    levoFLOXacin (LEVAQUIN) 750 MG tablet Take 1 tablet by mouth Daily.    levothyroxine (SYNTHROID, LEVOTHROID) 75 MCG tablet Take 1 tablet by mouth Every Morning Before Breakfast.    metoprolol tartrate (LOPRESSOR) 25 MG tablet Take 1 tablet by mouth 2 (Two) Times a Day.     MEDICATION LIST AND ALLERGIES REVIEWED.    No family history on file.  Social History     Tobacco Use    Smoking status: Never     Passive exposure: Never   Vaping Use    Vaping status: Never Used   Substance Use Topics    Alcohol use: Never    Drug use: Defer     Social History     Social History Narrative    Not on file     FAMILY AND SOCIAL HISTORY REVIEWED.    Review of Systems  ALL OTHER SYSTEMS REVIEWED AND ARE NEGATIVE.    Physical Exam and Clinical Information   /79   Pulse 70   SpO2 94%   Physical Exam  General Appearance: Awake, alert, in no acute distress  Head:   Pupils reactive & symmetrical B/L.  Neck:   Supple.   Lungs:   B/L Breath sounds present with decreased breath sounds on bases, no wheezing heard, no crackles.   Heart: S1 and S2 present, no murmur  Abdomen: Soft, nontender, no guarding or rigidity, bowel sounds positive.  Extremities:   no edema, warm to touch.  Pulses: Positive and symmetric.  Neurologic:  Moving all four extremities. Good strength bilaterally. No obvious focal deficit. Speech fluent.   Psychological: Normal affect, Cooperative        Results from last 7 days   Lab Units 07/18/24  1115   MAGNESIUM mg/dL 1.8  "    Estimated Creatinine Clearance: 68.5 mL/min (by C-G formula based on SCr of 1.06 mg/dL).          No results found for: \"LACTATE\"     Images: CT head report reviewed and did not show any acute intracranial process.  Subacute to chronic  Right PCA distribution infarct noted.    CT perfusion did not show any evidence of ischemic brain tissue or core infarct.    CTA neck showed no significant stenosis, dissection or large vessel occlusion.    I reviewed the patient's results and images.     Impression     Acute CVA (cerebrovascular accident)    Acute encephalopathy    Plan/Recommendations     1.  Patient presented here with acute change in mental status of unclear etiology.  Due to focal neurological deficit acute CVA was suspected.  Patient brought here to [facility] and workup thus far is nonrevealing for CVA.  Neurology team is following closely.  Concern for seizure as well and undergoing EEG currently.  Patient has been loaded with Keppra.  Will await further neurological workup.  Does not appear infectious etiology.  Outside labs reviewed and WBC count was normal.  No new fevers.  Mild thrombocytopenia.  Will monitor for now.  2.  Patient seems to be back to baseline.  Continue to monitor and reorient.  3.  Does not appear septic picture.  Will go ahead and check urinalysis to rule out any UTI.  Check chest x-ray since patient had episode of vomiting with his altered mental status to make sure no aspiration picture.  4.  Check twelve-lead EKG.  Troponin outside was within normal range.  5.  Check urine tox screen.  6.  Continue aspirin and Eliquis per neurology.  Continue on high-dose statins for possible CVA.  Repeat head CT scan tomorrow.  7.  Monitor urine output and electrolytes closely and replace electrolytes per ICU protocol.  8.  Echocardiogram with bubble study.  9.  PT/OT and speech evaluation.  If cleared swallow function then will start oral diet.  10.  Check thyroid " studies.    Continue close monitoring in ICU.      Time spent  43 minutes  (exclusive of procedure time)  including high complexity decision making to assess, manipulate, and support vital organ system failure in this individual who has impairment of one or more vital organ systems such that there is a high probability of imminent or life threatening deterioration in the patient’s condition.      Scott Cordova MD, [facility]  Pulmonary and Critical Care Medicine  07/18/24 15:16 EDT     CC: Josesito Trejo MD

## 2024-07-19 ENCOUNTER — APPOINTMENT (OUTPATIENT)
Dept: CT IMAGING | Facility: HOSPITAL | Age: 74
End: 2024-07-19
Payer: COMMERCIAL

## 2024-07-19 ENCOUNTER — APPOINTMENT (OUTPATIENT)
Dept: CARDIOLOGY | Facility: HOSPITAL | Age: 74
End: 2024-07-19
Payer: COMMERCIAL

## 2024-07-19 LAB
ALBUMIN SERPL-MCNC: 3.6 G/DL (ref 3.5–5.2)
ALBUMIN/GLOB SERPL: 1.5 G/DL
ALP SERPL-CCNC: 44 U/L (ref 39–117)
ALT SERPL W P-5'-P-CCNC: 22 U/L (ref 1–41)
ANION GAP SERPL CALCULATED.3IONS-SCNC: 9 MMOL/L (ref 5–15)
ASCENDING AORTA: 4.1 CM
AST SERPL-CCNC: 19 U/L (ref 1–40)
BASOPHILS # BLD AUTO: 0.04 10*3/MM3 (ref 0–0.2)
BASOPHILS NFR BLD AUTO: 0.7 % (ref 0–1.5)
BH CV ECHO AV AORTIC VALVE AT ACCEL TIME CALCULATED: NORMAL MSEC
BH CV ECHO MEAS - AI P1/2T: 886 MSEC
BH CV ECHO MEAS - AO MAX PG: 7 MMHG
BH CV ECHO MEAS - AO MEAN PG: 3 MMHG
BH CV ECHO MEAS - AO ROOT DIAM: 4.5 CM
BH CV ECHO MEAS - AO V2 MAX: 132.4 CM/SEC
BH CV ECHO MEAS - AO V2 VTI: 20.2 CM
BH CV ECHO MEAS - AT: 80 SEC
BH CV ECHO MEAS - AVA(I,D): 1.6 CM2
BH CV ECHO MEAS - EF(MOD-BP): 25.6 %
BH CV ECHO MEAS - EF(MOD-SP2): 22.9 %
BH CV ECHO MEAS - EF(MOD-SP4): 29.8 %
BH CV ECHO MEAS - IVS/LVPW: 1.07 CM
BH CV ECHO MEAS - IVSD: 1.5 CM
BH CV ECHO MEAS - LA DIMENSION: 8.8 CM
BH CV ECHO MEAS - LAT PEAK E' VEL: 7.1 CM/SEC
BH CV ECHO MEAS - LV MAX PG: 1.48 MMHG
BH CV ECHO MEAS - LV MEAN PG: 0.7 MMHG
BH CV ECHO MEAS - LV V1 MAX: 60.9 CM/SEC
BH CV ECHO MEAS - LV V1 VTI: 11.3 CM
BH CV ECHO MEAS - LVIDD: 7.5 CM
BH CV ECHO MEAS - LVIDS: 6.4 CM
BH CV ECHO MEAS - LVOT AREA: 3.1 CM2
BH CV ECHO MEAS - LVOT DIAM: 2 CM
BH CV ECHO MEAS - LVPWD: 1.4 CM
BH CV ECHO MEAS - MED PEAK E' VEL: 4.7 CM/SEC
BH CV ECHO MEAS - MV A MAX VEL: 75.9 CM/SEC
BH CV ECHO MEAS - MV E MAX VEL: 49.5 CM/SEC
BH CV ECHO MEAS - MV E/A: 0.65
BH CV ECHO MEAS - MV MAX PG: 4.7 MMHG
BH CV ECHO MEAS - MV MEAN PG: 1.4 MMHG
BH CV ECHO MEAS - MV P1/2T: 118 MSEC
BH CV ECHO MEAS - MV V2 VTI: 33.3 CM
BH CV ECHO MEAS - PA ACC TIME: 0.09 SEC
BH CV ECHO MEAS - PA V2 MAX: 93.6 CM/SEC
BH CV ECHO MEAS - RAP SYSTOLE: 3 MMHG
BH CV ECHO MEAS - RVSP: 9 MMHG
BH CV ECHO MEAS - TAPSE (>1.6): 1.28 CM
BH CV ECHO MEAS - TR MAX PG: 5.9 MMHG
BH CV ECHO MEAS - TR MAX VEL: 121.5 CM/SEC
BH CV ECHO MEASUREMENTS AVERAGE E/E' RATIO: 8.39
BH CV VAS BP RIGHT ARM: NORMAL MMHG
BH CV XLRA - RV BASE: 3.9 CM
BH CV XLRA - RV LENGTH: 8.6 CM
BH CV XLRA - RV MID: 2.8 CM
BH CV XLRA - TDI S': 6.8 CM/SEC
BILIRUB SERPL-MCNC: 0.7 MG/DL (ref 0–1.2)
BUN SERPL-MCNC: 16 MG/DL (ref 8–23)
BUN/CREAT SERPL: 17.6 (ref 7–25)
CALCIUM SPEC-SCNC: 8.8 MG/DL (ref 8.6–10.5)
CHLORIDE SERPL-SCNC: 105 MMOL/L (ref 98–107)
CHOLEST SERPL-MCNC: 181 MG/DL (ref 0–200)
CO2 SERPL-SCNC: 27 MMOL/L (ref 22–29)
CREAT SERPL-MCNC: 0.91 MG/DL (ref 0.76–1.27)
DEPRECATED RDW RBC AUTO: 47.4 FL (ref 37–54)
EGFRCR SERPLBLD CKD-EPI 2021: 89.5 ML/MIN/1.73
EOSINOPHIL # BLD AUTO: 0.06 10*3/MM3 (ref 0–0.4)
EOSINOPHIL NFR BLD AUTO: 1 % (ref 0.3–6.2)
ERYTHROCYTE [DISTWIDTH] IN BLOOD BY AUTOMATED COUNT: 14.1 % (ref 12.3–15.4)
GLOBULIN UR ELPH-MCNC: 2.4 GM/DL
GLUCOSE BLDC GLUCOMTR-MCNC: 100 MG/DL (ref 70–130)
GLUCOSE BLDC GLUCOMTR-MCNC: 103 MG/DL (ref 70–130)
GLUCOSE BLDC GLUCOMTR-MCNC: 105 MG/DL (ref 70–130)
GLUCOSE BLDC GLUCOMTR-MCNC: 111 MG/DL (ref 70–130)
GLUCOSE BLDC GLUCOMTR-MCNC: 394 MG/DL (ref 70–130)
GLUCOSE SERPL-MCNC: 118 MG/DL (ref 65–99)
HBA1C MFR BLD: 5.2 % (ref 4.8–5.6)
HCT VFR BLD AUTO: 44.4 % (ref 37.5–51)
HDLC SERPL-MCNC: 29 MG/DL (ref 40–60)
HGB BLD-MCNC: 14.6 G/DL (ref 13–17.7)
IMM GRANULOCYTES # BLD AUTO: 0.05 10*3/MM3 (ref 0–0.05)
IMM GRANULOCYTES NFR BLD AUTO: 0.8 % (ref 0–0.5)
IVRT: 111 MS
LDLC SERPL CALC-MCNC: 114 MG/DL (ref 0–100)
LDLC/HDLC SERPL: 3.77 {RATIO}
LEFT ATRIUM VOLUME INDEX: 36 ML/M2
LYMPHOCYTES # BLD AUTO: 1.11 10*3/MM3 (ref 0.7–3.1)
LYMPHOCYTES NFR BLD AUTO: 18.7 % (ref 19.6–45.3)
MAGNESIUM SERPL-MCNC: 2.1 MG/DL (ref 1.6–2.4)
MCH RBC QN AUTO: 30 PG (ref 26.6–33)
MCHC RBC AUTO-ENTMCNC: 32.9 G/DL (ref 31.5–35.7)
MCV RBC AUTO: 91.2 FL (ref 79–97)
MONOCYTES # BLD AUTO: 0.58 10*3/MM3 (ref 0.1–0.9)
MONOCYTES NFR BLD AUTO: 9.8 % (ref 5–12)
NEUTROPHILS NFR BLD AUTO: 4.1 10*3/MM3 (ref 1.7–7)
NEUTROPHILS NFR BLD AUTO: 69 % (ref 42.7–76)
NRBC BLD AUTO-RTO: 0 /100 WBC (ref 0–0.2)
PHOSPHATE SERPL-MCNC: 3.6 MG/DL (ref 2.5–4.5)
PLATELET # BLD AUTO: 152 10*3/MM3 (ref 140–450)
PMV BLD AUTO: 11 FL (ref 6–12)
POTASSIUM SERPL-SCNC: 3.9 MMOL/L (ref 3.5–5.2)
PROT SERPL-MCNC: 6 G/DL (ref 6–8.5)
QT INTERVAL: 476 MS
QTC INTERVAL: 471 MS
RBC # BLD AUTO: 4.87 10*6/MM3 (ref 4.14–5.8)
SODIUM SERPL-SCNC: 141 MMOL/L (ref 136–145)
TRIGL SERPL-MCNC: 214 MG/DL (ref 0–150)
VLDLC SERPL-MCNC: 38 MG/DL (ref 5–40)
WBC NRBC COR # BLD AUTO: 5.94 10*3/MM3 (ref 3.4–10.8)

## 2024-07-19 PROCEDURE — 25010000002 LEVETRIRACETAM PER 10 MG: Performed by: NURSE PRACTITIONER

## 2024-07-19 PROCEDURE — 25010000002 SULFUR HEXAFLUORIDE MICROSPH 60.7-25 MG RECONSTITUTED SUSPENSION: Performed by: NURSE PRACTITIONER

## 2024-07-19 PROCEDURE — 93306 TTE W/DOPPLER COMPLETE: CPT

## 2024-07-19 PROCEDURE — 99233 SBSQ HOSP IP/OBS HIGH 50: CPT | Performed by: STUDENT IN AN ORGANIZED HEALTH CARE EDUCATION/TRAINING PROGRAM

## 2024-07-19 PROCEDURE — 85025 COMPLETE CBC W/AUTO DIFF WBC: CPT

## 2024-07-19 PROCEDURE — 83735 ASSAY OF MAGNESIUM: CPT

## 2024-07-19 PROCEDURE — 97535 SELF CARE MNGMENT TRAINING: CPT

## 2024-07-19 PROCEDURE — 80061 LIPID PANEL: CPT | Performed by: NURSE PRACTITIONER

## 2024-07-19 PROCEDURE — 70450 CT HEAD/BRAIN W/O DYE: CPT

## 2024-07-19 PROCEDURE — 84100 ASSAY OF PHOSPHORUS: CPT

## 2024-07-19 PROCEDURE — 97161 PT EVAL LOW COMPLEX 20 MIN: CPT

## 2024-07-19 PROCEDURE — 97116 GAIT TRAINING THERAPY: CPT

## 2024-07-19 PROCEDURE — 80053 COMPREHEN METABOLIC PANEL: CPT

## 2024-07-19 PROCEDURE — 82948 REAGENT STRIP/BLOOD GLUCOSE: CPT

## 2024-07-19 PROCEDURE — 83036 HEMOGLOBIN GLYCOSYLATED A1C: CPT | Performed by: NURSE PRACTITIONER

## 2024-07-19 PROCEDURE — 97166 OT EVAL MOD COMPLEX 45 MIN: CPT

## 2024-07-19 PROCEDURE — 93306 TTE W/DOPPLER COMPLETE: CPT | Performed by: INTERNAL MEDICINE

## 2024-07-19 RX ORDER — POLYVINYL ALCOHOL 14 MG/ML
1 SOLUTION/ DROPS OPHTHALMIC
Status: DISCONTINUED | OUTPATIENT
Start: 2024-07-19 | End: 2024-07-20 | Stop reason: HOSPADM

## 2024-07-19 RX ORDER — LEVOTHYROXINE SODIUM 0.07 MG/1
75 TABLET ORAL
Status: DISCONTINUED | OUTPATIENT
Start: 2024-07-19 | End: 2024-07-20 | Stop reason: HOSPADM

## 2024-07-19 RX ORDER — LEVOTHYROXINE SODIUM 0.07 MG/1
TABLET ORAL
Status: COMPLETED
Start: 2024-07-19 | End: 2024-07-19

## 2024-07-19 RX ORDER — AMIODARONE HYDROCHLORIDE 200 MG/1
TABLET ORAL
Status: COMPLETED
Start: 2024-07-19 | End: 2024-07-19

## 2024-07-19 RX ORDER — AMIODARONE HYDROCHLORIDE 200 MG/1
200 TABLET ORAL DAILY
Status: DISCONTINUED | OUTPATIENT
Start: 2024-07-19 | End: 2024-07-20 | Stop reason: HOSPADM

## 2024-07-19 RX ADMIN — APIXABAN 5 MG: 5 TABLET, FILM COATED ORAL at 12:00

## 2024-07-19 RX ADMIN — ATORVASTATIN CALCIUM 80 MG: 40 TABLET, FILM COATED ORAL at 20:50

## 2024-07-19 RX ADMIN — LEVETIRACETAM 500 MG: 100 INJECTION, SOLUTION INTRAVENOUS at 12:00

## 2024-07-19 RX ADMIN — APIXABAN 5 MG: 5 TABLET, FILM COATED ORAL at 20:50

## 2024-07-19 RX ADMIN — ASPIRIN 81 MG CHEWABLE TABLET 81 MG: 81 TABLET CHEWABLE at 12:00

## 2024-07-19 RX ADMIN — LEVOTHYROXINE SODIUM 75 MCG: 75 TABLET ORAL at 18:20

## 2024-07-19 RX ADMIN — Medication 10 ML: at 09:00

## 2024-07-19 RX ADMIN — SULFUR HEXAFLUORIDE 5 ML: KIT at 10:30

## 2024-07-19 RX ADMIN — Medication 10 ML: at 20:50

## 2024-07-19 RX ADMIN — AMIODARONE HYDROCHLORIDE 200 MG: 200 TABLET ORAL at 12:00

## 2024-07-19 RX ADMIN — LEVETIRACETAM 500 MG: 100 INJECTION, SOLUTION INTRAVENOUS at 20:50

## 2024-07-19 RX ADMIN — LEVOTHYROXINE SODIUM 75 MCG: 0.07 TABLET ORAL at 18:20

## 2024-07-19 NOTE — PLAN OF CARE
Goal Outcome Evaluation:  Plan of Care Reviewed With: patient, spouse        Progress: improving  Outcome Evaluation: Pt alert and oriented thru out day.  NIHSS a 1 at AM shift change due to L hemianopsia from previous stroke. CT in afternoon- see Results. Adequate UOP and intake thru out day.  Spouse at bedside with questions answered and plan of care discussed. iFormulary    System downtime from start of shift until 1600.        Problem: Adult Inpatient Plan of Care  Goal: Plan of Care Review  Outcome: Ongoing, Progressing  Goal: Patient-Specific Goal (Individualized)  Outcome: Ongoing, Progressing  Goal: Absence of Hospital-Acquired Illness or Injury  Outcome: Ongoing, Progressing  Intervention: Identify and Manage Fall Risk  Intervention: Prevent Skin Injury  Intervention: Prevent and Manage VTE (Venous Thromboembolism) Risk  Intervention: Prevent Infection  Goal: Optimal Comfort and Wellbeing  Outcome: Ongoing, Progressing  Intervention: Monitor Pain and Promote Comfort  Intervention: Provide Person-Centered Care  Goal: Readiness for Transition of Care  Outcome: Ongoing, Progressing     Problem: Adjustment to Illness (Stroke, Ischemic/Transient Ischemic Attack)  Goal: Optimal Coping  Outcome: Ongoing, Progressing  Intervention: Support Psychosocial Response to Stroke     Problem: Bowel Elimination Impaired (Stroke, Ischemic/Transient Ischemic Attack)  Goal: Effective Bowel Elimination  Outcome: Ongoing, Progressing  Intervention: Promote Effective Bowel Elimination     Problem: Cerebral Tissue Perfusion (Stroke, Ischemic/Transient Ischemic Attack)  Goal: Optimal Cerebral Tissue Perfusion  Outcome: Ongoing, Progressing  Intervention: Protect and Optimize Cerebral Perfusion     Problem: Cognitive Impairment (Stroke, Ischemic/Transient Ischemic Attack)  Goal: Optimal Cognitive Function  Outcome: Ongoing, Progressing  Intervention: Optimize Cognitive Function     Problem: Communication Impairment (Stroke,  Ischemic/Transient Ischemic Attack)  Goal: Improved Communication Skills  Outcome: Ongoing, Progressing  Intervention: Optimize Communication Skills     Problem: Functional Ability Impaired (Stroke, Ischemic/Transient Ischemic Attack)  Goal: Optimal Functional Ability  Outcome: Ongoing, Progressing  Intervention: Optimize Functional Ability     Problem: Respiratory Compromise (Stroke, Ischemic/Transient Ischemic Attack)  Goal: Effective Oxygenation and Ventilation  Outcome: Ongoing, Progressing  Intervention: Optimize Oxygenation and Ventilation     Problem: Sensorimotor Impairment (Stroke, Ischemic/Transient Ischemic Attack)  Goal: Improved Sensorimotor Function  Outcome: Ongoing, Progressing  Intervention: Optimize Range of Motion, Motor Control and Function  Intervention: Optimize Sensory and Perceptual Ability     Problem: Swallowing Impairment (Stroke, Ischemic/Transient Ischemic Attack)  Goal: Optimal Eating and Swallowing without Aspiration  Outcome: Ongoing, Progressing     Problem: Urinary Elimination Impaired (Stroke, Ischemic/Transient Ischemic Attack)  Goal: Effective Urinary Elimination  Outcome: Ongoing, Progressing  Intervention: Promote Effective Bladder Elimination     Problem: Heart Failure Comorbidity  Goal: Maintenance of Heart Failure Symptom Control  Outcome: Ongoing, Progressing  Intervention: Maintain Heart Failure-Management     Problem: Hypertension Comorbidity  Goal: Blood Pressure in Desired Range  Outcome: Ongoing, Progressing  Intervention: Maintain Blood Pressure Management     Problem: Seizure Disorder Comorbidity  Goal: Maintenance of Seizure Control  Outcome: Ongoing, Progressing

## 2024-07-19 NOTE — CONSULTS
Diabetes Education    Patient Name:  Pierre Church  YOB: 1950  MRN: [id number]  Admit Date:  7/18/2024        Consult received for diabetes education per stroke protocol. Chart reviewed. A1c is 5.2% No history of diabetes or medications for diabetes.   Please reconsult as needed.   Patient does not qualify for the follow up stroke class based on the exclusion criteria of no diabetes diagnosis.  [phone]  Electronically signed by:  Kristy Cristina RN  07/19/24 08:18 EDT

## 2024-07-19 NOTE — PROGRESS NOTES
Stroke Progress Note       Chief Complaint:  left sided weakness.     Subjective    Subjective     Subjective:  Patient sitting in the chair  No new issues     Objective      Temp:  [97.6 °F (36.4 °C)-98.3 °F (36.8 °C)] 97.6 °F (36.4 °C)  Heart Rate:  [52-58] 54  Resp:  [18] 18  BP: ()/(53-78) 90/64            Results Review:    I reviewed the patient's new clinical results.    Lab Results (last 24 hours)       Procedure Component Value Units Date/Time    POC Glucose Once [[id number]]  (Normal) Collected: 07/19/24 1655    Specimen: Blood Updated: 07/19/24 1728     Glucose 103 mg/dL     POC Glucose Once [[id number]]  (Normal) Collected: 07/19/24 1143    Specimen: Blood Updated: 07/19/24 1603     Glucose 111 mg/dL     POC Glucose Once [[id number]]  (Normal) Collected: 07/19/24 0726    Specimen: Blood Updated: 07/19/24 0727     Glucose 100 mg/dL     Hemoglobin A1c [[id number]]  (Normal) Collected: 07/19/24 0223    Specimen: Blood Updated: 07/19/24 0258     Hemoglobin A1C 5.20 %     Narrative:      Hemoglobin A1C Ranges:    Increased Risk for Diabetes  5.7% to 6.4%  Diabetes                     >= 6.5%  Diabetic Goal                < 7.0%    Lipid Panel [[id number]]  (Abnormal) Collected: 07/19/24 0223    Specimen: Blood Updated: 07/19/24 0248     Total Cholesterol 181 mg/dL      Triglycerides 214 mg/dL      HDL Cholesterol 29 mg/dL      LDL Cholesterol  114 mg/dL      VLDL Cholesterol 38 mg/dL      LDL/HDL Ratio 3.77    Narrative:      Cholesterol Reference Ranges  (U.S. Department of Health and Human Services ATP III Classifications)    Desirable          <200 mg/dL  Borderline High    200-239 mg/dL  High Risk          >240 mg/dL      Triglyceride Reference Ranges  (U.S. Department of Health and Human Services ATP III Classifications)    Normal           <150 mg/dL  Borderline High  150-199 mg/dL  High             200-499 mg/dL  Very High        >500 mg/dL    HDL Reference Ranges  (U.S. Department of Health and Human  Services ATP III Classifications)    Low     <40 mg/dl (major risk factor for CHD)  High    >60 mg/dl ('negative' risk factor for CHD)        LDL Reference Ranges  (U.S. Department of Health and Human Services ATP III Classifications)    Optimal          <100 mg/dL  Near Optimal     100-129 mg/dL  Borderline High  130-159 mg/dL  High             160-189 mg/dL  Very High        >189 mg/dL    Magnesium [[id number]]  (Normal) Collected: 07/19/24 0223    Specimen: Blood Updated: 07/19/24 0248     Magnesium 2.1 mg/dL     Comprehensive Metabolic Panel [[id number]]  (Abnormal) Collected: 07/19/24 0223    Specimen: Blood Updated: 07/19/24 0248     Glucose 118 mg/dL      BUN 16 mg/dL      Creatinine 0.91 mg/dL      Sodium 141 mmol/L      Potassium 3.9 mmol/L      Comment: Slight hemolysis detected by analyzer. Result may be falsely elevated.        Chloride 105 mmol/L      CO2 27.0 mmol/L      Calcium 8.8 mg/dL      Total Protein 6.0 g/dL      Albumin 3.6 g/dL      ALT (SGPT) 22 U/L      AST (SGOT) 19 U/L      Alkaline Phosphatase 44 U/L      Total Bilirubin 0.7 mg/dL      Globulin 2.4 gm/dL      Comment: Calculated Result        A/G Ratio 1.5 g/dL      BUN/Creatinine Ratio 17.6     Anion Gap 9.0 mmol/L      eGFR 89.5 mL/min/1.73     Narrative:      GFR Normal >60  Chronic Kidney Disease <60  Kidney Failure <15    The GFR formula is only valid for adults with stable renal function between ages 18 and 70.    Phosphorus [[id number]]  (Normal) Collected: 07/19/24 0223    Specimen: Blood Updated: 07/19/24 0248     Phosphorus 3.6 mg/dL     CBC & Differential [[id number]]  (Abnormal) Collected: 07/19/24 0223    Specimen: Blood Updated: 07/19/24 0234    Narrative:      The following orders were created for panel order CBC & Differential.  Procedure                               Abnormality         Status                     ---------                               -----------         ------                     CBC Auto  Differential[[id number]]        Abnormal            Final result                 Please view results for these tests on the individual orders.    CBC Auto Differential [[id number]]  (Abnormal) Collected: 07/19/24 0223    Specimen: Blood Updated: 07/19/24 0234     WBC 5.94 10*3/mm3      RBC 4.87 10*6/mm3      Hemoglobin 14.6 g/dL      Hematocrit 44.4 %      MCV 91.2 fL      MCH 30.0 pg      MCHC 32.9 g/dL      RDW 14.1 %      RDW-SD 47.4 fl      MPV 11.0 fL      Platelets 152 10*3/mm3      Neutrophil % 69.0 %      Lymphocyte % 18.7 %      Monocyte % 9.8 %      Eosinophil % 1.0 %      Basophil % 0.7 %      Immature Grans % 0.8 %      Neutrophils, Absolute 4.10 10*3/mm3      Lymphocytes, Absolute 1.11 10*3/mm3      Monocytes, Absolute 0.58 10*3/mm3      Eosinophils, Absolute 0.06 10*3/mm3      Basophils, Absolute 0.04 10*3/mm3      Immature Grans, Absolute 0.05 10*3/mm3      nRBC 0.0 /100 WBC     POC Glucose Once [[id number]]  (Normal) Collected: 07/18/24 2057    Specimen: Blood Updated: 07/18/24 2108     Glucose 122 mg/dL     Potassium [[id number]]  (Normal) Collected: 07/18/24 1732    Specimen: Blood Updated: 07/18/24 1753     Potassium 3.8 mmol/L           XR Chest 1 View    Result Date: 7/18/2024  Impression: 1.Evidence for atelectasis versus infiltrate in the left lung base. The findings may indicate left lower lobe pneumonia. Mild developing pulmonary edema could also be considered. Chronic changes could also have this appearance. Electronically Signed: Alexey Guillen MD  7/18/2024 4:45 PM EDT  Workstation ID: [id number]    EEG    Result Date: 7/18/2024  Normal study This report is transcribed using the Dragon dictation system.      CT Angiogram Head w AI Analysis of LVO    Result Date: 7/18/2024   1. Patent bilateral carotid and vertebral arteries with no significant stenosis or dissection 2. No evidence of acute intracranial large vessel arterial occlusion  This report was finalized on 7/18/2024 2:46 PM by Ahsan  Rachel.      CT Angiogram Neck    Result Date: 7/18/2024   1. Patent bilateral carotid and vertebral arteries with no significant stenosis or dissection 2. No evidence of acute intracranial large vessel arterial occlusion  This report was finalized on 7/18/2024 2:46 PM by Ahsan Ramos.      CT CEREBRAL PERFUSION WITH & WITHOUT CONTRAST    Result Date: 7/18/2024  No evidence of ischemic brain tissue at risk or core infarct  This report was finalized on 7/18/2024 2:25 PM by Ahsan Ramos.      CT Head Without Contrast    Result Date: 7/18/2024   1. No intracranial hemorrhage 2. Subacute to chronic right PCA distribution infarct  These results were communicated to the stroke team navigator at 1:25 p.m.  This report was finalized on 7/18/2024 1:25 PM by Ahsan Ramos.      CT Head Without Contrast    Result Date: 7/18/2024  No acute intracranial process . Dr. Roe was notified at  7/18/2024 12:09 PM Images reviewed, interpreted, and dictated by Dr. GABBY Kerr. Transcribed by Maci Aguilar PA-C.   Results for orders placed during the hospital encounter of 07/18/24    Adult Transthoracic Echo Complete W/ Cont if Necessary Per Protocol (With Agitated Saline)    Interpretation Summary    Left ventricular ejection fraction appears to be 21 - 25%.    The left ventricular cavity is severely dilated.    Left ventricular wall thickness is consistent with moderate concentric hypertrophy.    There is a mitral valve ring present.  No significant mitral valve stenosis or regurgitation.    Moderate dilation of the aortic root is present.  Measures 4.5 cm    Technically difficult bubble study.  Appears negative for interatrial shunt    No evidence of left ventricular thrombus.            Assessment/Plan     Assessment/Plan:  74-year-old,  male with known diagnosis of prior CVA (right PCA stroke, September 2023; s/p thrombolytics with hemorrhagic conversion, residual left hemianopsia), A-fib (on Eliquis), MI,  CABG (2014) AICD, HTN, and HLD who presents as a transfer from [facility].     CTAs- open left vert dominant, no sig atheor/stenosis       ED at OSH reported left sided weakness. But family and our stroke team did not appreciate any left sided weakness on arrival. On my exam, patient is neurologically at his baseline. Moves all extremities equally 4/5, residual left HH, sensory intact. Coordination intact.       Less likely this is an acute ischemic event of the brain. Possibility of TIA cannot be exclude based on the story. Resume eliquis and aspirin. Normal blood pressure goals, Okay for the floor.     Stroke team will continue to follow the patient.                     Abbe Cano MD  07/19/24  17:38 EDT

## 2024-07-20 ENCOUNTER — READMISSION MANAGEMENT (OUTPATIENT)
Dept: CALL CENTER | Facility: HOSPITAL | Age: 74
End: 2024-07-20
Payer: COMMERCIAL

## 2024-07-20 VITALS
BODY MASS INDEX: 32.6 KG/M2 | SYSTOLIC BLOOD PRESSURE: 126 MMHG | OXYGEN SATURATION: 92 % | TEMPERATURE: 97.8 F | HEIGHT: 67 IN | HEART RATE: 70 BPM | RESPIRATION RATE: 18 BRPM | DIASTOLIC BLOOD PRESSURE: 90 MMHG | WEIGHT: 207.67 LBS

## 2024-07-20 LAB
ANION GAP SERPL CALCULATED.3IONS-SCNC: 8 MMOL/L (ref 5–15)
BASOPHILS # BLD AUTO: 0.03 10*3/MM3 (ref 0–0.2)
BASOPHILS NFR BLD AUTO: 0.6 % (ref 0–1.5)
BUN SERPL-MCNC: 20 MG/DL (ref 8–23)
BUN/CREAT SERPL: 25 (ref 7–25)
CALCIUM SPEC-SCNC: 8.8 MG/DL (ref 8.6–10.5)
CHLORIDE SERPL-SCNC: 104 MMOL/L (ref 98–107)
CO2 SERPL-SCNC: 28 MMOL/L (ref 22–29)
CREAT SERPL-MCNC: 0.8 MG/DL (ref 0.76–1.27)
DEPRECATED RDW RBC AUTO: 47.1 FL (ref 37–54)
EGFRCR SERPLBLD CKD-EPI 2021: 94 ML/MIN/1.73
EOSINOPHIL # BLD AUTO: 0.1 10*3/MM3 (ref 0–0.4)
EOSINOPHIL NFR BLD AUTO: 1.9 % (ref 0.3–6.2)
ERYTHROCYTE [DISTWIDTH] IN BLOOD BY AUTOMATED COUNT: 13.9 % (ref 12.3–15.4)
GLUCOSE BLDC GLUCOMTR-MCNC: 105 MG/DL (ref 70–130)
GLUCOSE BLDC GLUCOMTR-MCNC: 99 MG/DL (ref 70–130)
GLUCOSE SERPL-MCNC: 94 MG/DL (ref 65–99)
HCT VFR BLD AUTO: 46.2 % (ref 37.5–51)
HGB BLD-MCNC: 14.9 G/DL (ref 13–17.7)
IMM GRANULOCYTES # BLD AUTO: 0.07 10*3/MM3 (ref 0–0.05)
IMM GRANULOCYTES NFR BLD AUTO: 1.3 % (ref 0–0.5)
LYMPHOCYTES # BLD AUTO: 1.16 10*3/MM3 (ref 0.7–3.1)
LYMPHOCYTES NFR BLD AUTO: 22.1 % (ref 19.6–45.3)
MAGNESIUM SERPL-MCNC: 2.2 MG/DL (ref 1.6–2.4)
MCH RBC QN AUTO: 29.7 PG (ref 26.6–33)
MCHC RBC AUTO-ENTMCNC: 32.3 G/DL (ref 31.5–35.7)
MCV RBC AUTO: 92 FL (ref 79–97)
MONOCYTES # BLD AUTO: 0.54 10*3/MM3 (ref 0.1–0.9)
MONOCYTES NFR BLD AUTO: 10.3 % (ref 5–12)
NEUTROPHILS NFR BLD AUTO: 3.35 10*3/MM3 (ref 1.7–7)
NEUTROPHILS NFR BLD AUTO: 63.8 % (ref 42.7–76)
NRBC BLD AUTO-RTO: 0 /100 WBC (ref 0–0.2)
PLATELET # BLD AUTO: 137 10*3/MM3 (ref 140–450)
PMV BLD AUTO: 10.9 FL (ref 6–12)
POTASSIUM SERPL-SCNC: 3.8 MMOL/L (ref 3.5–5.2)
RBC # BLD AUTO: 5.02 10*6/MM3 (ref 4.14–5.8)
SODIUM SERPL-SCNC: 140 MMOL/L (ref 136–145)
WBC NRBC COR # BLD AUTO: 5.25 10*3/MM3 (ref 3.4–10.8)

## 2024-07-20 PROCEDURE — 97530 THERAPEUTIC ACTIVITIES: CPT

## 2024-07-20 PROCEDURE — 80048 BASIC METABOLIC PNL TOTAL CA: CPT | Performed by: INTERNAL MEDICINE

## 2024-07-20 PROCEDURE — 99233 SBSQ HOSP IP/OBS HIGH 50: CPT | Performed by: STUDENT IN AN ORGANIZED HEALTH CARE EDUCATION/TRAINING PROGRAM

## 2024-07-20 PROCEDURE — 83735 ASSAY OF MAGNESIUM: CPT | Performed by: INTERNAL MEDICINE

## 2024-07-20 PROCEDURE — 97535 SELF CARE MNGMENT TRAINING: CPT

## 2024-07-20 PROCEDURE — 85025 COMPLETE CBC W/AUTO DIFF WBC: CPT | Performed by: INTERNAL MEDICINE

## 2024-07-20 PROCEDURE — 25010000002 LEVETRIRACETAM PER 10 MG: Performed by: NURSE PRACTITIONER

## 2024-07-20 PROCEDURE — 99232 SBSQ HOSP IP/OBS MODERATE 35: CPT | Performed by: INTERNAL MEDICINE

## 2024-07-20 PROCEDURE — 25010000002 ONDANSETRON PER 1 MG: Performed by: NURSE PRACTITIONER

## 2024-07-20 PROCEDURE — 99239 HOSP IP/OBS DSCHRG MGMT >30: CPT | Performed by: INTERNAL MEDICINE

## 2024-07-20 PROCEDURE — 82948 REAGENT STRIP/BLOOD GLUCOSE: CPT

## 2024-07-20 RX ORDER — FLUTICASONE PROPIONATE 50 MCG
2 SPRAY, SUSPENSION (ML) NASAL DAILY
Status: DISCONTINUED | OUTPATIENT
Start: 2024-07-20 | End: 2024-07-20 | Stop reason: HOSPADM

## 2024-07-20 RX ORDER — FUROSEMIDE 20 MG/1
20 TABLET ORAL DAILY
Status: DISCONTINUED | OUTPATIENT
Start: 2024-07-20 | End: 2024-07-20 | Stop reason: HOSPADM

## 2024-07-20 RX ORDER — ASPIRIN 81 MG/1
81 TABLET, CHEWABLE ORAL DAILY
Qty: 30 TABLET | Refills: 0 | Status: SHIPPED | OUTPATIENT
Start: 2024-07-21

## 2024-07-20 RX ORDER — MONTELUKAST SODIUM 10 MG/1
10 TABLET ORAL NIGHTLY
Status: DISCONTINUED | OUTPATIENT
Start: 2024-07-20 | End: 2024-07-20 | Stop reason: HOSPADM

## 2024-07-20 RX ORDER — ONDANSETRON 2 MG/ML
4 INJECTION INTRAMUSCULAR; INTRAVENOUS EVERY 6 HOURS PRN
Status: DISCONTINUED | OUTPATIENT
Start: 2024-07-20 | End: 2024-07-20 | Stop reason: HOSPADM

## 2024-07-20 RX ORDER — ATORVASTATIN CALCIUM 80 MG/1
80 TABLET, FILM COATED ORAL NIGHTLY
Qty: 90 TABLET | Refills: 0 | Status: SHIPPED | OUTPATIENT
Start: 2024-07-20

## 2024-07-20 RX ADMIN — LEVOTHYROXINE SODIUM 75 MCG: 0.07 TABLET ORAL at 06:19

## 2024-07-20 RX ADMIN — ASPIRIN 81 MG CHEWABLE TABLET 81 MG: 81 TABLET CHEWABLE at 08:00

## 2024-07-20 RX ADMIN — AMIODARONE HYDROCHLORIDE 200 MG: 200 TABLET ORAL at 13:00

## 2024-07-20 RX ADMIN — LEVETIRACETAM 500 MG: 100 INJECTION, SOLUTION INTRAVENOUS at 08:00

## 2024-07-20 RX ADMIN — POLYVINYL ALCOHOL 1 DROP: 1.4 SOLUTION/ DROPS OPHTHALMIC at 06:18

## 2024-07-20 RX ADMIN — APIXABAN 5 MG: 5 TABLET, FILM COATED ORAL at 08:00

## 2024-07-20 RX ADMIN — ONDANSETRON 4 MG: 2 INJECTION INTRAMUSCULAR; INTRAVENOUS at 07:57

## 2024-07-20 RX ADMIN — Medication 10 ML: at 09:55

## 2024-07-20 NOTE — PROGRESS NOTES
Stroke Progress Note       Chief Complaint:  left sided weakness.     Subjective    Subjective     Subjective:  Feels fine    Objective      Temp:  [97.8 °F (36.6 °C)-98.4 °F (36.9 °C)] 97.8 °F (36.6 °C)  Heart Rate:  [51-74] 64  Resp:  [18] 18  BP: (123-152)/(73-97) 123/74            Results Review:    I reviewed the patient's new clinical results.    Lab Results (last 24 hours)       Procedure Component Value Units Date/Time    POC Glucose Once [[id number]]  (Normal) Collected: 07/20/24 1131    Specimen: Blood Updated: 07/20/24 1132     Glucose 99 mg/dL     POC Glucose Once [[id number]]  (Normal) Collected: 07/20/24 0743    Specimen: Blood Updated: 07/20/24 0745     Glucose 105 mg/dL     Magnesium [[id number]]  (Normal) Collected: 07/20/24 0450    Specimen: Blood Updated: 07/20/24 0525     Magnesium 2.2 mg/dL     Basic Metabolic Panel [[id number]]  (Normal) Collected: 07/20/24 0450    Specimen: Blood Updated: 07/20/24 0525     Glucose 94 mg/dL      BUN 20 mg/dL      Creatinine 0.80 mg/dL      Sodium 140 mmol/L      Potassium 3.8 mmol/L      Comment: Slight hemolysis detected by analyzer. Result may be falsely elevated.        Chloride 104 mmol/L      CO2 28.0 mmol/L      Calcium 8.8 mg/dL      BUN/Creatinine Ratio 25.0     Anion Gap 8.0 mmol/L      eGFR 94.0 mL/min/1.73     Narrative:      GFR Normal >60  Chronic Kidney Disease <60  Kidney Failure <15    The GFR formula is only valid for adults with stable renal function between ages 18 and 70.    CBC & Differential [[id number]]  (Abnormal) Collected: 07/20/24 0450    Specimen: Blood Updated: 07/20/24 0502    Narrative:      The following orders were created for panel order CBC & Differential.  Procedure                               Abnormality         Status                     ---------                               -----------         ------                     CBC Auto Differential[[id number]]        Abnormal            Final result               Scan  Slide[[id number]]                                                                    Please view results for these tests on the individual orders.    CBC Auto Differential [[id number]]  (Abnormal) Collected: 07/20/24 0450    Specimen: Blood Updated: 07/20/24 0502     WBC 5.25 10*3/mm3      RBC 5.02 10*6/mm3      Hemoglobin 14.9 g/dL      Hematocrit 46.2 %      MCV 92.0 fL      MCH 29.7 pg      MCHC 32.3 g/dL      RDW 13.9 %      RDW-SD 47.1 fl      MPV 10.9 fL      Platelets 137 10*3/mm3      Neutrophil % 63.8 %      Lymphocyte % 22.1 %      Monocyte % 10.3 %      Eosinophil % 1.9 %      Basophil % 0.6 %      Immature Grans % 1.3 %      Neutrophils, Absolute 3.35 10*3/mm3      Lymphocytes, Absolute 1.16 10*3/mm3      Monocytes, Absolute 0.54 10*3/mm3      Eosinophils, Absolute 0.10 10*3/mm3      Basophils, Absolute 0.03 10*3/mm3      Immature Grans, Absolute 0.07 10*3/mm3      nRBC 0.0 /100 WBC     POC Glucose Once [[id number]]  (Normal) Collected: 07/19/24 2105    Specimen: Blood Updated: 07/19/24 2107     Glucose 105 mg/dL     POC Glucose Once [[id number]]  (Abnormal) Collected: 07/19/24 2102    Specimen: Blood Updated: 07/19/24 2105     Glucose 394 mg/dL     POC Glucose Once [[id number]]  (Normal) Collected: 07/19/24 1655    Specimen: Blood Updated: 07/19/24 1728     Glucose 103 mg/dL     POC Glucose Once [[id number]]  (Normal) Collected: 07/19/24 1143    Specimen: Blood Updated: 07/19/24 1603     Glucose 111 mg/dL           XR Chest 1 View    Result Date: 7/18/2024  Impression: 1.Evidence for atelectasis versus infiltrate in the left lung base. The findings may indicate left lower lobe pneumonia. Mild developing pulmonary edema could also be considered. Chronic changes could also have this appearance. Electronically Signed: Alexey Guillen MD  7/18/2024 4:45 PM EDT  Workstation ID: [id number]    EEG    Result Date: 7/18/2024  Normal study This report is transcribed using the Dragon dictation system.      CT Angiogram  Head w AI Analysis of LVO    Result Date: 7/18/2024   1. Patent bilateral carotid and vertebral arteries with no significant stenosis or dissection 2. No evidence of acute intracranial large vessel arterial occlusion  This report was finalized on 7/18/2024 2:46 PM by Ahsan Ramos.      CT Angiogram Neck    Result Date: 7/18/2024   1. Patent bilateral carotid and vertebral arteries with no significant stenosis or dissection 2. No evidence of acute intracranial large vessel arterial occlusion  This report was finalized on 7/18/2024 2:46 PM by Ahsan Ramos.      CT CEREBRAL PERFUSION WITH & WITHOUT CONTRAST    Result Date: 7/18/2024  No evidence of ischemic brain tissue at risk or core infarct  This report was finalized on 7/18/2024 2:25 PM by Ahsan Ramos.      CT Head Without Contrast    Result Date: 7/18/2024   1. No intracranial hemorrhage 2. Subacute to chronic right PCA distribution infarct  These results were communicated to the stroke team navigator at 1:25 p.m.  This report was finalized on 7/18/2024 1:25 PM by Ahsan Ramos.     Results for orders placed during the hospital encounter of 07/18/24    Adult Transthoracic Echo Complete W/ Cont if Necessary Per Protocol (With Agitated Saline)    Interpretation Summary    Left ventricular ejection fraction appears to be 21 - 25%.    The left ventricular cavity is severely dilated.    Left ventricular wall thickness is consistent with moderate concentric hypertrophy.    There is a mitral valve ring present.  No significant mitral valve stenosis or regurgitation.    Moderate dilation of the aortic root is present.  Measures 4.5 cm    Technically difficult bubble study.  Appears negative for interatrial shunt    No evidence of left ventricular thrombus.            Assessment/Plan     Assessment/Plan:  74-year-old,  male with known diagnosis of prior CVA (right PCA stroke, September 2023; s/p thrombolytics with hemorrhagic conversion, residual left  hemianopsia), A-fib (on Eliquis), MI, CABG (2014) AICD, HTN, and HLD who presents as a transfer from [facility] [place].     CTAs- open left vert dominant, no sig atheor/stenosis       ED at OSH reported left sided weakness. But family and our stroke team did not appreciate any left sided weakness on arrival. On my exam, patient is neurologically at his baseline. Moves all extremities equally 4/5, residual left HH, sensory intact. Coordination intact.       Recrudescence of old stroke deficits    Plan  Continue home eliquis and aspirin.   Continue Lipitor 80 daily  Normal blood pressure goals  Stroke clinic in one month     No further work up from stroke stand point.                     Abbe Cano MD  07/20/24  13:02 EDT

## 2024-07-20 NOTE — THERAPY TREATMENT NOTE
Patient Name: Pierre Church  : 1950    MRN: [id number]                              Today's Date: 2024       Admit Date: 2024    Visit Dx:     ICD-10-CM ICD-9-CM   1. Cognitive communication deficit  R41.841 799.52     Patient Active Problem List   Diagnosis    Acute CVA (cerebrovascular accident)    Acute encephalopathy     Past Medical History:   Diagnosis Date    Arthritis     CHF (congestive heart failure)     Coronary artery disease     Disease of thyroid gland     Elevated cholesterol     Hypertension     Stroke      Past Surgical History:   Procedure Laterality Date    CARDIAC CATHETERIZATION      CARDIAC SURGERY      CORONARY ARTERY BYPASS GRAFT      HERNIA REPAIR        General Information       Row Name 24 1115          Physical Therapy Time and Intention    Document Type therapy note (daily note)  -ND     Mode of Treatment physical therapy  -ND       Row Name 24 1115          General Information    Patient Profile Reviewed yes  -ND     Prior Level of Function independent:;all household mobility;community mobility;gait;transfer;bed mobility  -ND     Existing Precautions/Restrictions fall;other (see comments)  L sided visual deficit at baseline.  -ND     Barriers to Rehab visual deficit  -ND       Row Name 24 1115          Living Environment    People in Home spouse  -ND       Row Name 24 1115          Home Main Entrance    Number of Stairs, Main Entrance four  -ND     Stair Railings, Main Entrance railing on left side (ascending)  -ND       Row Name 24 1115          Stairs Within Home, Primary    Number of Stairs, Within Home, Primary none  -ND       Row Name 24 1115          Cognition    Orientation Status (Cognition) oriented x 3  -ND       Row Name 24 1115          Safety Issues, Functional Mobility    Safety Issues Affecting Function (Mobility) safety precaution awareness  -ND     Impairments Affecting Function (Mobility)  balance;endurance/activity tolerance;strength;visual/perceptual  -ND               User Key  (r) = Recorded By, (t) = Taken By, (c) = Cosigned By      Initials Name Provider Type    Deya Welch PT Physical Therapist                   Mobility       Row Name 07/20/24 1121          Bed Mobility    Bed Mobility supine-sit  -ND     Supine-Sit [place] (Bed Mobility) modified independence  -ND     Assistive Device (Bed Mobility) head of bed elevated  -ND     Comment, (Bed Mobility) Denies symptoms with positional change.  -ND       Row Name 07/20/24 1121          Sit-Stand Transfer    Sit-Stand [place] (Transfers) contact guard  -ND     Comment, (Sit-Stand Transfer) x1 from EOB.  -ND       Row Name 07/20/24 1121          Gait/Stairs (Locomotion)    [place] Level (Gait) contact guard;verbal cues;nonverbal cues (demo/gesture)  -ND     Assistive Device (Gait) walker, front-wheeled  -ND     Distance in Feet (Gait) 120  -ND     Deviations/Abnormal Patterns (Gait) bilateral deviations;gait speed decreased;stride length decreased  -ND     Comment, (Gait/Stairs) Pt initially ambulates without AD and decreased arm swing, reports weakness in BLE. After ~80' introduced RWx and pt finished ambulation bout using. Pt endorses feeling increased safety and confidence with using RWx and reports he has one at home.  -ND               User Key  (r) = Recorded By, (t) = Taken By, (c) = Cosigned By      Initials Name Provider Type    Deya Welch PT Physical Therapist                   Obj/Interventions       Row Name 07/20/24 1123          Balance    Balance Assessment sitting static balance;sitting dynamic balance;sit to stand dynamic balance;standing static balance;standing dynamic balance;ankle strategy assessment  -ND     Static Sitting Balance independent  -ND     Dynamic Sitting Balance supervision  -ND     Position, Sitting Balance unsupported;sitting edge of bed  -ND     Sit to Stand Dynamic  Balance contact guard  -ND     Static Standing Balance standby assist  -ND     Dynamic Standing Balance contact guard  -ND     Position/Device Used, Standing Balance unsupported  -ND     Balance Interventions sitting;standing;sit to stand;supported;static;dynamic;minimal challenge;tandem standing;single limb stance  -ND     Comment, Balance MIP, tandem stance, perturbations which pt corrects for appropriately without need for external assist from PT.  -ND       Row Name 07/20/24 1123          Sensory Assessment (Somatosensory)    Sensory Assessment (Somatosensory) LE sensation intact  -ND       Row Name 07/20/24 1123          Ankle Strategy (Balance)    Ankle Strategy Promotion (Balance) slow and small perturbations, externally generated  -ND               User Key  (r) = Recorded By, (t) = Taken By, (c) = Cosigned By      Initials Name Provider Type    Deya Welch PT Physical Therapist                   Goals/Plan       Row Name 07/20/24 1131          Transfer Goal 1 (PT)    Activity/Assistive Device (Transfer Goal 1, PT) sit-to-stand/stand-to-sit  -ND     [place] Level/Cues Needed (Transfer Goal 1, PT) modified independence  -ND     Time Frame (Transfer Goal 1, PT) short term goal (STG);5 days  -ND       [facility] Name 07/20/24 1131          Stairs Goal 1 (PT)    Activity/Assistive Device (Stairs Goal 1, PT) ascending stairs;descending stairs;using handrail, left  -ND     [place] Level/Cues Needed (Stairs Goal 1, PT) modified independence  -ND     Number of Stairs (Stairs Goal 1, PT) 4  -ND     Time Frame (Stairs Goal 1, PT) long term goal (LTG);10 days  -ND               User Key  (r) = Recorded By, (t) = Taken By, (c) = Cosigned By      Initials Name Provider Type    Deya Welch PT Physical Therapist                   Clinical Impression       Row Name 07/20/24 1125          Pain    Pretreatment Pain Rating 0/10 - no pain  -ND     Posttreatment Pain Rating 0/10 - no pain  -ND       [facility]  Name 07/20/24 1125          Plan of Care Review    Plan of Care Reviewed With patient;spouse  -ND     Progress improving  -ND     Outcome Evaluation Pt ambultes 120' without overt LOB or major instabilities. Pt continues to present with generalized weakness and limited activity tolerance but overall has demonstrated improvements. Recommend home with assist following d/c.  -ND       Row Name 07/20/24 1125          Therapy Assessment/Plan (PT)    Patient/Family Therapy Goals Statement (PT) Return to baseline.  -ND     Criteria for Skilled Interventions Met (PT) yes;meets criteria;skilled treatment is necessary  -ND     Predicted Duration of Therapy Intervention (PT) 1 week  -ND       Row Name 07/20/24 1125          Vital Signs    Pre Systolic BP Rehab 123  -ND     Pre Treatment Diastolic BP 74  -ND     Post Systolic BP Rehab 144  -ND     Post Treatment Diastolic BP 82  -ND     Pretreatment Heart Rate (beats/min) 65  -ND     Posttreatment Heart Rate (beats/min) 76  -ND     Pre SpO2 (%) 94  -ND     O2 Delivery Pre Treatment room air  -ND     O2 Delivery Intra Treatment room air  -ND     Post SpO2 (%) 94  -ND     O2 Delivery Post Treatment room air  -ND     Pre Patient Position Supine  -ND     Intra Patient Position Standing  -ND     Post Patient Position Sitting  -ND       Row Name 07/20/24 1125          Positioning and Restraints    Pre-Treatment Position in bed  -ND     Post Treatment Position chair  -ND     In Chair notified nsg;reclined;legs elevated;call light within reach;encouraged to call for assist;exit alarm on;waffle cushion  -ND               User Key  (r) = Recorded By, (t) = Taken By, (c) = Cosigned By      Initials Name Provider Type    Deya Welch, PT Physical Therapist                   Outcome Measures       Row Name 07/20/24 1131 07/20/24 0800       How much help from another person do you currently need...    Turning from your back to your side while in flat bed without using bedrails? 4   -ND 4  -AM    Moving from lying on back to sitting on the side of a flat bed without bedrails? 4  -ND 4  -AM    Moving to and from a bed to a chair (including a wheelchair)? 3  -ND 4  -AM    Standing up from a chair using your arms (e.g., wheelchair, bedside chair)? 3  -ND 4  -AM    Climbing 3-5 steps with a railing? 3  -ND 3  -AM    To walk in hospital room? 3  -ND 3  -AM    AM-PAC 6 Clicks Score (PT) 20 -ND 22  -AM    Highest Level of Mobility Goal 6 --> Walk 10 steps or more  -ND 7 --> Walk 25 feet or more  -AM      Row Name 07/20/24 1131          Modified [place] Scale    Pre-Stroke Modified [place] Scale 6 - Unable to determine (UTD) from the medical record documentation  -ND     Modified [place] Scale 3 - Moderate disability.  Requiring some help, but able to walk without assistance.  -ND       Row Name 07/20/24 1131          Functional Assessment    Outcome Measure Options AM-PAC 6 Clicks Basic Mobility (PT);Modified [place]  -ND               User Key  (r) = Recorded By, (t) = Taken By, (c) = Cosigned By      Initials Name Provider Type    AM Mily Moreno, RN Registered Nurse    Deya Welch, PT Physical Therapist                                 Physical Therapy Education       Title: PT OT SLP Therapies (In Progress)       Topic: Physical Therapy (In Progress)       Point: Mobility training (Done)       Learning Progress Summary             Patient Acceptance, E, VU by ND at 7/20/2024 1132                         Point: Home exercise program (Not Started)       Learner Progress:  Not documented in this visit.              Point: Body mechanics (Done)       Learning Progress Summary             Patient Acceptance, E, VU by ND at 7/20/2024 1132                         Point: Precautions (Done)       Learning Progress Summary             Patient Acceptance, E, VU by ND at 7/20/2024 1132                                         User Key       Initials Effective Dates Name Provider Type Discipline    ND  11/16/23 -  Deya Lomeli PT Physical Therapist PT                  PT Recommendation and Plan     Plan of Care Reviewed With: patient, spouse  Progress: improving  Outcome Evaluation: Pt ambultes 120' without overt LOB or major instabilities. Pt continues to present with generalized weakness and limited activity tolerance but overall has demonstrated improvements. Recommend home with assist following d/c.     Time Calculation:         PT Charges       Row Name 07/20/24 1133             Time Calculation    Start Time 0953  -ND      PT Received On 07/20/24  -ND         Timed Charges    93172 - PT Therapeutic Activity Minutes 12  -ND         Total Minutes    Timed Charges Total Minutes 12  -ND       Total Minutes 12  -ND                User Key  (r) = Recorded By, (t) = Taken By, (c) = Cosigned By      Initials Name Provider Type    ND Deya Lomeli, PT Physical Therapist                  Therapy Charges for Today       Code Description Service Date Service Provider Modifiers Qty    [id number]  PT THERAPEUTIC ACT EA 15 MIN 7/20/2024 Deya Lomeli, PT GP 1            PT G-Codes  Outcome Measure Options: AM-PAC 6 Clicks Basic Mobility (PT), Modified [place]  AM-PAC 6 Clicks Score (PT): 20  Modified [place] Scale: 3 - Moderate disability.  Requiring some help, but able to walk without assistance.  PT Discharge Summary  Anticipated Discharge Disposition (PT): home with assist    Deya Lomeli PT  7/20/2024

## 2024-07-20 NOTE — DISCHARGE SUMMARY
[facility]   DISCHARGE SUMMARY    Patient Name: Pierre Church  : 1950  MRN: [id number]    Date of Admission: 2024  Date of Discharge:  2024  Primary Care Physician: Josesito Trejo MD    Consults       Date and Time Order Name Status Description    2024  1:42 PM Inpatient Cardiology Consult Completed     2024  1:40 PM Inpatient Neurology Consult Stroke Completed             Hospital Course     Presenting Problem:   Acute CVA (cerebrovascular accident) [I63.9]      Active and Resolved Problems  Active Hospital Problems    Diagnosis  POA    **Acute CVA (cerebrovascular accident) [I63.9]  Unknown    Acute encephalopathy [G93.40]  Unknown      Resolved Hospital Problems   No resolved problems to display.         Hospital Course:  Pierre Church is a 74 y.o. male with diagnosis of prior CVA with a right PCA stroke in 2023 at that time received thrombolytics which was complicated with hemorrhagic conversion, A-fib on Eliquis, MI, CABG, hypertension and dyslipidemia. Patient was brought to [facility] emergency room due to concern about change in mental status. Family did not notice any seizure-like activity but due to confusion he was brought to the emergency room. Patient then developed left-sided weakness involving face arm and leg as well as facial droop and slurred speech. NIH stroke scale was 8 at that time. There were no CT capabilities so patient was transferred to [facility] for further evaluation and management. Patient on arrival underwent CT scan of the head, CT angiogram and CT perfusion. No definite evidence of CVA noted. CT perfusion study was normal. He states that he is back to his baseline now but does not recall what happened earlier today.     He was admitted to the ICU and started on empiric Keppra for possible seizure. EEG was negative and keppra was recommended to be discontinued at discharge. Stroke Neurology followed during the  admission. CT imaging was negative for stroke. MRI could not be obtained. He was recommended to continue Eliquis and ASA. Lipitor was added.     Echocardiogram was performed and showed cardiomyopathy which is a chronic finding. There was no definite shunt on bubble study.     He was resumed on his home Eliquis and ASA.     It was discussed with the patient and his wife that he may benefit from an outpatient sleep study as there is a risk of sleep apnea in associated with A-fib and recurrent strokes.     He was seen by PT/OT who recommended home with assistance.       Discharge Follow Up Recommendations for labs/diagnostics:   Follow up in Stroke Neurology Clinic at discharge.       Pertinent  and/or Most Recent Results     LAB RESULTS:          Lab 07/20/24 0450 07/19/24 0223 07/18/24  1115   WBC 5.25 5.94  --    HEMOGLOBIN 14.9 14.6  --    HEMATOCRIT 46.2 44.4  --    PLATELETS 137* 152  --    NEUTROS ABS 3.35 4.10  --    IMMATURE GRANS (ABS) 0.07* 0.05  --    LYMPHS ABS 1.16 1.11  --    MONOS ABS 0.54 0.58  --    EOS ABS 0.10 0.06  --    MCV 92.0 91.2  --    PROTIME  --   --  10.6   APTT  --   --  28.0         Lab 07/20/24  0450 07/19/24 0223 07/18/24  1732 07/18/24  1618 07/18/24  1115   SODIUM 140 141  --   --   --    POTASSIUM 3.8 3.9 3.8  --   --    CHLORIDE 104 105  --   --   --    CO2 28.0 27.0  --   --   --    ANION GAP 8.0 9.0  --   --   --    BUN 20 16  --   --   --    CREATININE 0.80 0.91  --   --   --    EGFR 94.0 89.5  --   --   --    GLUCOSE 94 118*  --   --   --    CALCIUM 8.8 8.8  --   --   --    MAGNESIUM 2.2 2.1  --   --  1.8   PHOSPHORUS  --  3.6  --   --   --    HEMOGLOBIN A1C  --  5.20  --   --   --    TSH  --   --   --  2.700  --          Lab 07/19/24 0223   TOTAL PROTEIN 6.0   ALBUMIN 3.6   GLOBULIN 2.4   ALT (SGPT) 22   AST (SGOT) 19   BILIRUBIN 0.7   ALK PHOS 44         Lab 07/18/24  1115   PROTIME 10.6   INR 1.03         Lab 07/19/24  0223   CHOLESTEROL 181   LDL CHOL 114*   HDL CHOL  29*   TRIGLYCERIDES 214*             Brief Urine Lab Results  (Last result in the past 365 days)        Color   Clarity   Blood   Leuk Est   Nitrite   Protein   CREAT   Urine HCG        07/18/24 1600 Yellow   Clear   Negative   Negative   Negative   Negative                 Microbiology Results (last 10 days)       ** No results found for the last 240 hours. **            XR Chest 1 View    Result Date: 7/18/2024  Impression: Impression: 1.Evidence for atelectasis versus infiltrate in the left lung base. The findings may indicate left lower lobe pneumonia. Mild developing pulmonary edema could also be considered. Chronic changes could also have this appearance. Electronically Signed: Alexey Guillen MD  7/18/2024 4:45 PM EDT  Workstation ID: [id number]    EEG    Result Date: 7/18/2024  Impression: Normal study This report is transcribed using the Dragon dictation system.      CT Angiogram Head w AI Analysis of LVO    Result Date: 7/18/2024  Impression:  1. Patent bilateral carotid and vertebral arteries with no significant stenosis or dissection 2. No evidence of acute intracranial large vessel arterial occlusion  This report was finalized on 7/18/2024 2:46 PM by Ahsan Ramos.      CT Angiogram Neck    Result Date: 7/18/2024  Impression:  1. Patent bilateral carotid and vertebral arteries with no significant stenosis or dissection 2. No evidence of acute intracranial large vessel arterial occlusion  This report was finalized on 7/18/2024 2:46 PM by Ahsan Ramos.      CT CEREBRAL PERFUSION WITH & WITHOUT CONTRAST    Result Date: 7/18/2024  Impression: No evidence of ischemic brain tissue at risk or core infarct  This report was finalized on 7/18/2024 2:25 PM by Ahsan Ramos.      CT Head Without Contrast    Result Date: 7/18/2024  Impression:  1. No intracranial hemorrhage 2. Subacute to chronic right PCA distribution infarct  These results were communicated to the stroke team navigator at 1:25 p.m.  This report  was finalized on 7/18/2024 1:25 PM by Ahsan Ramos.      CT Head Without Contrast    Result Date: 7/18/2024  Impression: No acute intracranial process . Dr. Roe was notified at  7/18/2024 12:09 PM Images reviewed, interpreted, and dictated by Dr. GABBY Kerr. Transcribed by Maci Aguilar PA-C.             Results for orders placed during the hospital encounter of 07/18/24    Adult Transthoracic Echo Complete W/ Cont if Necessary Per Protocol (With Agitated Saline)    Interpretation Summary    Left ventricular ejection fraction appears to be 21 - 25%.    The left ventricular cavity is severely dilated.    Left ventricular wall thickness is consistent with moderate concentric hypertrophy.    There is a mitral valve ring present.  No significant mitral valve stenosis or regurgitation.    Moderate dilation of the aortic root is present.  Measures 4.5 cm    Technically difficult bubble study.  Appears negative for interatrial shunt    No evidence of left ventricular thrombus.        Discharge Details        Discharge Medications        New Medications        Instructions Start Date   aspirin 81 MG chewable tablet  Replaces: aspirin 81 MG EC tablet   81 mg, Oral, Daily   Start Date: July 21, 2024     atorvastatin 80 MG tablet  Commonly known as: LIPITOR   80 mg, Oral, Nightly             Continue These Medications        Instructions Start Date   amiodarone 200 MG tablet  Commonly known as: PACERONE   1 tablet, Oral, Daily      Eliquis 5 MG tablet tablet  Generic drug: apixaban   1 tablet, Oral, 2 Times Daily      Entresto 24-26 MG tablet  Generic drug: sacubitril-valsartan   1 tablet, Oral, 2 Times Daily      fluticasone 50 MCG/ACT nasal spray  Commonly known as: FLONASE   2 sprays, Nasal, Daily      furosemide 20 MG tablet  Commonly known as: LASIX   Take 1 tablet by mouth Daily.      levothyroxine 75 MCG tablet  Commonly known as: SYNTHROID, LEVOTHROID   75 mcg, Oral, Every Morning Before Breakfast       metoprolol tartrate 25 MG tablet  Commonly known as: LOPRESSOR   1 tablet, Oral, 2 Times Daily      montelukast 10 MG tablet  Commonly known as: SINGULAIR   10 mg, Oral, 2 Times Daily             Stop These Medications      albuterol sulfate  (90 Base) MCG/ACT inhaler  Commonly known as: PROVENTIL HFA;VENTOLIN HFA;PROAIR HFA     aspirin 81 MG EC tablet  Replaced by: aspirin 81 MG chewable tablet     fish oil 1200 MG capsule capsule     ZyrTEC Allergy 10 MG capsule  Generic drug: Cetirizine HCl              Allergies   Allergen Reactions    Sulfate Rash         Discharge Disposition:  Home or Self Care    Diet:  Hospital:  Diet Order   Procedures    Diet: Regular/House, Cardiac; Healthy Heart (2-3 Na+); Texture: Regular (IDDSI 7); Fluid Consistency: Thin (IDDSI 0)         Discharge Activity: As tolerated.         CODE STATUS:    There are no questions and answers to display.         Future Appointments   Date Time Provider Department Center   10/11/2024 10:30 AM Nallely Paredes MD [facility]           Time spent on Discharge including face to face service:  31 minutes    Vicky Pickett, DO

## 2024-07-20 NOTE — PLAN OF CARE
Goal Outcome Evaluation:  Plan of Care Reviewed With: patient, spouse        Progress: improving  Outcome Evaluation: Pt nearing recent baseline progressing to [place] for LB dressing and CGA for ADL related mobility. Remains limited by generalized weakness and standing balance deficit - will cont IPOT per POC as tolerated. Rec d/c to home with assist and OPPT for overall strengthening, no home equipment needs identified.      Anticipated Discharge Disposition (OT): home with assist, home with outpatient therapy services

## 2024-07-20 NOTE — PROGRESS NOTES
Intensive Care Follow-up     Hospital:  LOS: 2 days   Mr. Pierre Church, 74 y.o. male is followed for:   Acute CVA (cerebrovascular accident)            History of present illness:   74-year-old male with diagnosis of prior CVA with a right PCA stroke in September 2023 at that time received thrombolytics which  was complicated with hemorrhagic conversion, A-fib on Eliquis, MI, CABG, cardiomyopathy with ejection fraction less than 25%, AICD in place hypertension and dyslipidemia.  Patient was brought to [facility] emergency room due to concern about change in mental status.  Family did not notice any seizure-like activity but due to confusion he was brought to the emergency room.  Patient then developed left-sided weakness involving face arm and leg as well as facial droop and slurred speech.  NIH stroke scale was 8 at that time.  There were no CT capabilities so patient was transferred to [facility] for further evaluation and management.  Patient on arrival underwent CT scan of the head, CT angiogram and CT perfusion.  No definite evidence of CVA noted.  CT perfusion study was normal.  He states that he is back to his baseline now but does not recall what happened earlier today.  Denies any headaches.  Denies any vision changes other than baseline. patient apparently had episode of vomiting in the CT scanner.  Patient denies any ongoing chest pain or shortness of breath.  No palpitations.     Hemodynamically stable.  No fevers noted.     Patient denies any history of smoking.  Denies any alcohol use or any illicit drug use.      Subjective   Interval History:  Patient started to feel better.  Confusion cleared up.  Unable to get MRI due to pacemaker in place.  Eating well and working with physical therapy.                 The patient's past medical, surgical and social history were reviewed and updated in Epic as appropriate.       Objective     Infusions:     Medications:  amiodarone, 200 mg, Oral,  "Daily  apixaban, 5 mg, Oral, Q12H  aspirin, 81 mg, Oral, Daily   Or  aspirin, 300 mg, Rectal, Daily  atorvastatin, 80 mg, Oral, Nightly  levETIRAcetam, 500 mg, Intravenous, Q12H  levothyroxine, 75 mcg, Oral, Q AM  sodium chloride, 10 mL, Intravenous, Q12H        Vital Sign Min/Max for last 24 hours  Temp  Min: 97.8 °F (36.6 °C)  Max: 98.4 °F (36.9 °C)   BP  Min: 123/74  Max: 152/97   Pulse  Min: 51  Max: 74   Resp  Min: 18  Max: 18   SpO2  Min: 92 %  Max: 99 %   No data recorded       Input/Output for last 24 hour shift  07/19 0701 - 07/20 0700  In: 808 [P.O.:808]  Out: 500 [Urine:500]      Objective:  Vital signs: (most recent): Blood pressure 123/74, pulse 64, temperature 97.8 °F (36.6 °C), temperature source Oral, resp. rate 18, height 170.2 cm (67.01\"), weight 94.2 kg (207 lb 10.8 oz), SpO2 98%.            General Appearance: Awake, alert, in no acute distress  Head: Pupils reactive & symmetrical B/L.  Lungs:   B/L Breath sounds present with decreased breath sounds on bases, no wheezing heard, no crackles.   Heart: S1 and S2 present, no murmur  Abdomen: Soft, nontender, no guarding or rigidity, bowel sounds positive.  Extremities:   no edema, warm to touch.  Neurologic:  Moving all four extremities. Good strength bilaterally.   Interval:  (Shift change)  1a. Level of Consciousness: 0-->Alert, keenly responsive  1b. LOC Questions: 0-->Answers both questions correctly  1c. LOC Commands: 0-->Performs both tasks correctly  2. Best Gaze: 0-->Normal  3. Visual: 1-->Partial hemianopia  4. Facial Palsy: 1-->Minor paralysis (flattened nasolabial fold, asymmetry on smiling)  5a. Motor Arm, Left: 0-->No drift, limb holds 90 (or 45) degrees for full 10 secs  5b. Motor Arm, Right: 0-->No drift, limb holds 90 (or 45) degrees for full 10 secs  6a. Motor Leg, Left: 0-->No drift, leg holds 30 degree position for full 5 secs  6b. Motor Leg, Right: 0-->No drift, leg holds 30 degree position for full 5 secs  7. Limb Ataxia: " 0-->Absent  8. Sensory: 0-->Normal, no sensory loss  9. Best Language: 0-->No aphasia, normal  10. Dysarthria: 0-->Normal  11. Extinction and Inattention (formerly Neglect): 0-->No abnormality    Total (NIH Stroke Scale): 2    Psychological: Normal affect, Cooperative      Results from last 7 days   Lab Units 07/20/24  0450 07/19/24  0223   WBC 10*3/mm3 5.25 5.94   HEMOGLOBIN g/dL 14.9 14.6   PLATELETS 10*3/mm3 137* 152     Results from last 7 days   Lab Units 07/20/24  0450 07/19/24  0223 07/18/24  1732 07/18/24  1115   SODIUM mmol/L 140 141  --   --    POTASSIUM mmol/L 3.8 3.9 3.8  --    CO2 mmol/L 28.0 27.0  --   --    BUN mg/dL 20 16  --   --    CREATININE mg/dL 0.80 0.91  --   --    MAGNESIUM mg/dL 2.2 2.1  --  1.8   PHOSPHORUS mg/dL  --  3.6  --   --    GLUCOSE mg/dL 94 118*  --   --      Estimated Creatinine Clearance: 91.3 mL/min (by C-G formula based on SCr of 0.8 mg/dL).          Images:   Echo reviewed.   Interpretation Summary         Left ventricular ejection fraction appears to be 21 - 25%.    The left ventricular cavity is severely dilated.    Left ventricular wall thickness is consistent with moderate concentric hypertrophy.    There is a mitral valve ring present.  No significant mitral valve stenosis or regurgitation.    Moderate dilation of the aortic root is present.  Measures 4.5 cm    Technically difficult bubble study.  Appears negative for interatrial shunt    No evidence of left ventricular thrombus.    I reviewed the patient's results and images.     CT head report from yesterday pending.    Assessment & Plan   Impression        Acute CVA (cerebrovascular accident)    Acute encephalopathy       Plan        1.  Patient presented with acute left-sided weakness and confusion.  EEG was negative study for seizures.  MRI could not be obtained.  CT imaging did not show any evidence of stroke.  Neurology team considering possible TIA.  2.  Patient's Eliquis and aspirin resumed.  Blood pressure is  acceptable.  3.  Tolerating oral diet well.  Requesting simethicone which will be started.  4.  Echocardiogram reviewed and showed cardiomyopathy which is a chronic finding for the patient.  No definite shunt noted.  5.  PT/OT.  6.  [place]ane oxygen and will monitor oxygen saturation closely.  7.  Discussed with patient and his wife about risk for sleep apnea and it connection with A-fib and recurrent strokes.  He may benefit from evaluation of sleep apnea as outpatient if desired.    Will discuss with neurology team regarding further workup and discharge plans pending therapy evaluation.  Patient can be transferred to telemetry floor.  Plan of care and goals reviewed with multidisciplinary/antibiotic stewardship team during rounds.   I discussed the patient's findings and my recommendations with patient, family, and nursing staff       Scott Cordova MD, [facility]  Pulmonary, Critical care and Sleep Medicine

## 2024-07-20 NOTE — DISCHARGE INSTR - APPOINTMENTS
Follow up with PCP in a week.    Follow up with [facility] in 1 month.   [phone]

## 2024-07-20 NOTE — PLAN OF CARE
Goal Outcome Evaluation:  Plan of Care Reviewed With: patient, spouse        Progress: improving  Outcome Evaluation: Pt ambultes 120' without overt LOB or major instabilities. Pt continues to present with generalized weakness and limited activity tolerance but overall has demonstrated improvements. Recommend home with assist following d/c.      Anticipated Discharge Disposition (PT): home with assist

## 2024-07-20 NOTE — THERAPY TREATMENT NOTE
Patient Name: Pierre Church  : 1950    MRN: [id number]                              Today's Date: 2024       Admit Date: 2024    Visit Dx:     ICD-10-CM ICD-9-CM   1. Cognitive communication deficit  R41.841 799.52     Patient Active Problem List   Diagnosis    Acute CVA (cerebrovascular accident)    Acute encephalopathy     Past Medical History:   Diagnosis Date    Arthritis     CHF (congestive heart failure)     Coronary artery disease     Disease of thyroid gland     Elevated cholesterol     Hypertension     Stroke      Past Surgical History:   Procedure Laterality Date    CARDIAC CATHETERIZATION      CARDIAC SURGERY      CORONARY ARTERY BYPASS GRAFT      HERNIA REPAIR        General Information       Row Name 24 1127          OT Time and Intention    Document Type therapy note (daily note)  -CS     Mode of Treatment occupational therapy  -CS       Row Name 24 1127          General Information    Patient Profile Reviewed yes  -CS     Prior Level of Function independent:;all household mobility;ADL's  Pt and spouse own crutches, WC, RW, SPC. Safety frame over toilet and shower seating in place  -CS     Existing Precautions/Restrictions fall;other (see comments)  L sided visual deficit at baseline  -CS     Barriers to Rehab visual deficit  -CS       Row Name 24 1127          Living Environment    People in Home spouse  -CS       Row Name 24 1127          Home Main Entrance    Number of Stairs, Main Entrance four  -CS     Stair Railings, Main Entrance railing on left side (ascending)  -CS       Row Name 24 1127          Cognition    Orientation Status (Cognition) oriented x 3  -CS       Row Name 24 1127          Safety Issues, Functional Mobility    Safety Issues Affecting Function (Mobility) safety precautions follow-through/compliance;safety precaution awareness  -CS     Impairments Affecting Function (Mobility) balance;endurance/activity  tolerance;visual/perceptual;strength  -CS               User Key  (r) = Recorded By, (t) = Taken By, (c) = Cosigned By      Initials Name Provider Type    CS Jignesh Quiroga OT Occupational Therapist                     Mobility/ADL's       Row Name 07/20/24 1130          Bed Mobility    Supine-Sit [place] (Bed Mobility) modified independence  -CS     Assistive Device (Bed Mobility) head of bed elevated  -CS     Comment, (Bed Mobility) appropriate sequencing intact, no dizziness reported  -CS       Row Name 07/20/24 1130          Transfers    Transfers sit-stand transfer;bed-chair transfer  -CS       Row Name 07/20/24 1130          Bed-Chair Transfer    Bed-Chair [place] (Transfers) contact guard  -CS       Row Name 07/20/24 1130          Sit-Stand Transfer    Sit-Stand [place] (Transfers) standby assist  -       Row Name 07/20/24 1130          Functional Mobility    Functional Mobility- Comment defer to PT for specifics, improved balance and stability with RW use (owns and expresses he will use once discharged)  -CS     Patient was able to Ambulate yes  -       Row Name 07/20/24 1130          Activities of Daily Living    BADL Assessment/Intervention upper body dressing;lower body dressing  -       Row Name 07/20/24 1130          Upper Body Dressing Assessment/Training    [place] Level (Upper Body Dressing) doff;pajama/robe;supervision  -     Position (Upper Body Dressing) edge of bed sitting  -CS     Comment, (Upper Body Dressing) line mngt  -CS       Row Name 07/20/24 1130          Lower Body Dressing Assessment/Training    [place] Level (Lower Body Dressing) don;pants/bottoms;minimum assist (75% patient effort);other (see comments)  -CS     Comment, (Lower Body Dressing) Pt required assist for threading (baseline), able to tie bow with drawstring indpendently in standing  -CS               User Key  (r) = Recorded By, (t) = Taken By, (c) = Cosigned By      Initials Name  Provider Type    CS Jignesh Quiroga OT Occupational Therapist                   Obj/Interventions       Row Name 07/20/24 1132          Vision Assessment/Intervention    Visual Impairment/Limitations visual/perceptual impairments present;peripheral vision impaired left  -       Row Name 07/20/24 1132          Range of Motion Comprehensive    General Range of Motion no range of motion deficits identified  -       Row Name 07/20/24 1132          Strength Comprehensive (MMT)    General Manual Muscle Testing (MMT) Assessment upper extremity strength deficits identified  -CS     Comment, General Manual Muscle Testing (MMT) Assessment L grasp 4+/5, otherwise grossly 5/5  -       Row Name 07/20/24 1132          Motor Skills    Motor Skills coordination;functional endurance  -     Coordination bimanual skills;WFL  -     Functional Endurance stable on RA, easily fatigued  -       Row Name 07/20/24 1132          Balance    Balance Assessment sitting static balance;sitting dynamic balance;standing static balance;standing dynamic balance  -     Static Sitting Balance supervision  -     Dynamic Sitting Balance supervision  -CS     Position, Sitting Balance unsupported  -CS     Static Standing Balance standby assist  -     Dynamic Standing Balance contact guard  -CS     Position/Device Used, Standing Balance unsupported  -CS     Balance Interventions sitting;standing;sit to stand;occupation based/functional task  -CS     Comment, Balance improved with RW use  -               User Key  (r) = Recorded By, (t) = Taken By, (c) = Cosigned By      Initials Name Provider Type    CS Jignesh Quiroga OT Occupational Therapist                   Goals/Plan       Row Name 07/20/24 1139          Transfer Goal 1 (OT)    Activity/Assistive Device (Transfer Goal 1, OT) bed-to-chair/chair-to-bed;toilet  -     [place] Level/Cues Needed (Transfer Goal 1, OT) modified independence  -     Time Frame (Transfer Goal 1,  OT) short term goal (STG);5 days  -CS       Row Name 07/20/24 1139          Dressing Goal 1 (OT)    Activity/Device (Dressing Goal 1, OT) lower body dressing  -CS     [place]/Cues Needed (Dressing Goal 1, OT) standby assist  -CS     Time Frame (Dressing Goal 1, OT) long term goal (LTG);10 days  -CS     Strategies/Barriers (Dressing Goal 1, OT) AE prn  -CS     Progress/Outcome (Dressing Goal 1, OT) new goal  -CS       Row Name 07/20/24 1139          Therapy Assessment/Plan (OT)    Planned Therapy Interventions (OT) activity tolerance training;functional balance retraining;occupation/activity based interventions;ROM/therapeutic exercise;strengthening exercise;transfer/mobility retraining;patient/caregiver education/training;neuromuscular control/coordination retraining;adaptive equipment training  -CS               User Key  (r) = Recorded By, (t) = Taken By, (c) = Cosigned By      Initials Name Provider Type    CS Jignesh Quiroga, OT Occupational Therapist                   Clinical Impression       Row Name 07/20/24 1137          Pain Assessment    Pretreatment Pain Rating 0/10 - no pain  -CS     Posttreatment Pain Rating 0/10 - no pain  -CS       Row Name 07/20/24 1137          Plan of Care Review    Plan of Care Reviewed With patient;spouse  -CS     Progress improving  -CS     Outcome Evaluation Pt nearing recent baseline progressing to [place] for LB dressing and CGA for ADL related mobility. Remains limited by generalized weakness and standing balance deficit - will cont IPOT per POC as tolerated. Rec d/c to home with assist and OPPT for overall strengthening, no home equipment needs identified.  -CS       Row Name 07/20/24 1136          Therapy Assessment/Plan (OT)    Patient/Family Therapy Goal Statement (OT) return home/baseline  -CS     Rehab Potential (OT) good, to achieve stated therapy goals  -CS     Criteria for Skilled Therapeutic Interventions Met (OT) yes;meets criteria;skilled treatment is  necessary  -CS     Therapy Frequency (OT) daily  -CS     Predicted Duration of Therapy Intervention (OT) 10 days  -CS       Row Name 07/20/24 1137          Therapy Plan Review/Discharge Plan (OT)    Anticipated Discharge Disposition (OT) home with assist;home with outpatient therapy services  -CS       Row Name 07/20/24 1137          Vital Signs    Pre Systolic BP Rehab 123  -CS     Pre Treatment Diastolic BP 74  -CS     Post Systolic BP Rehab 144  -CS     Post Treatment Diastolic BP 82  -CS     O2 Delivery Pre Treatment room air  -CS     O2 Delivery Intra Treatment room air  -CS     O2 Delivery Post Treatment room air  -CS     Pre Patient Position Supine  -CS     Intra Patient Position Standing  -CS     Post Patient Position Sitting  -CS       Row Name 07/20/24 1137          Positioning and Restraints    Pre-Treatment Position in bed  -CS     Post Treatment Position chair  -CS     In Chair notified nsg;reclined;sitting;call light within reach;encouraged to call for assist;exit alarm on;LUE elevated;legs elevated;RUE elevated;waffle cushion  -CS               User Key  (r) = Recorded By, (t) = Taken By, (c) = Cosigned By      Initials Name Provider Type    CS Jignesh Quiroga, OT Occupational Therapist                   Outcome Measures       Row Name 07/20/24 1140          How much help from another is currently needed...    Putting on and taking off regular lower body clothing? 3  -CS     Bathing (including washing, rinsing, and drying) 3  -CS     Toileting (which includes using toilet bed pan or urinal) 3  -CS     Putting on and taking off regular upper body clothing 4  -CS     Taking care of personal grooming (such as brushing teeth) 4  -CS     Eating meals 4  -CS     AM-PAC 6 Clicks Score (OT) 21  -CS       Row Name 07/20/24 1131 07/20/24 0800       How much help from another person do you currently need...    Turning from your back to your side while in flat bed without using bedrails? 4  -ND 4  -AM    Moving  from lying on back to sitting on the side of a flat bed without bedrails? 4  -ND 4  -AM    Moving to and from a bed to a chair (including a wheelchair)? 3  -ND 4  -AM    Standing up from a chair using your arms (e.g., wheelchair, bedside chair)? 3  -ND 4  -AM    Climbing 3-5 steps with a railing? 3  -ND 3  -AM    To walk in hospital room? 3  -ND 3  -AM    AM-PAC 6 Clicks Score (PT) 20 -ND 22  -AM    Highest Level of Mobility Goal 6 --> Walk 10 steps or more  -ND 7 --> Walk 25 feet or more  -AM      Row Name 07/20/24 1140 07/20/24 1131       Modified [place] Scale    Pre-Stroke Modified [place] Scale -- 6 - Unable to determine (UTD) from the medical record documentation  -ND    Modified [place] Scale 2 - Slight disability.  Unable to carry out all previous activities but able to look after own affairs without assistance.  - 3 - Moderate disability.  Requiring some help, but able to walk without assistance.  -ND      Row Name 07/20/24 1140 07/20/24 1131       Functional Assessment    Outcome Measure Options AM-PAC 6 Clicks Daily Activity (OT);Modified [place]  -CS AM-PAC 6 Clicks Basic Mobility (PT);Modified [place]  -ND              User Key  (r) = Recorded By, (t) = Taken By, (c) = Cosigned By      Initials Name Provider Type    Jignesh Baltazar OT Occupational Therapist    Mily Grijalva, RN Registered Nurse    Deya Welch, PT Physical Therapist                    Occupational Therapy Education       Title: PT OT SLP Therapies (In Progress)       Topic: Occupational Therapy (Done)       Point: ADL training (Done)       Description:   Instruct learner(s) on proper safety adaptation and remediation techniques during self care or transfers.   Instruct in proper use of assistive devices.                  Learning Progress Summary             Patient Acceptance, E,D, VU,DU by  at 7/20/2024 1142   Significant Other Acceptance, E,D, VU,DU by  at 7/20/2024 1142                         Point: Home  exercise program (Done)       Description:   Instruct learner(s) on appropriate technique for monitoring, assisting and/or progressing therapeutic exercises/activities.                  Learning Progress Summary             Patient Acceptance, E,D, VU,DU by  at 7/20/2024 1142   Significant Other Acceptance, E,D, VU,DU by  at 7/20/2024 1142                         Point: Precautions (Done)       Description:   Instruct learner(s) on prescribed precautions during self-care and functional transfers.                  Learning Progress Summary             Patient Acceptance, E,D, VU,DU by  at 7/20/2024 1142   Significant Other Acceptance, E,D, VU,DU by CS at 7/20/2024 1142                         Point: Body mechanics (Done)       Description:   Instruct learner(s) on proper positioning and spine alignment during self-care, functional mobility activities and/or exercises.                  Learning Progress Summary             Patient Acceptance, E,D, VU,DU by  at 7/20/2024 1142   Significant Other Acceptance, E,D, VU,DU by  at 7/20/2024 1142                                         User Key       Initials Effective Dates Name Provider Type Discipline     06/16/21 -  Jignesh Quiroga, OT Occupational Therapist OT                  OT Recommendation and Plan  Planned Therapy Interventions (OT): activity tolerance training, functional balance retraining, occupation/activity based interventions, ROM/therapeutic exercise, strengthening exercise, transfer/mobility retraining, patient/caregiver education/training, neuromuscular control/coordination retraining, adaptive equipment training  Therapy Frequency (OT): daily  Plan of Care Review  Plan of Care Reviewed With: patient, spouse  Progress: improving  Outcome Evaluation: Pt nearing recent baseline progressing to [place] for LB dressing and CGA for ADL related mobility. Remains limited by generalized weakness and standing balance deficit - will cont IPOT per POC as  tolerated. Rec d/c to home with assist and OPPT for overall strengthening, no home equipment needs identified.     Time Calculation:         Time Calculation- OT       Row Name 07/20/24 1142             Time Calculation- OT    OT Start Time 0941  -CS      OT Received On 07/20/24  -CS      OT Goal Re-Cert Due Date 07/29/24  -CS         Timed Charges    79175 - OT Therapeutic Activity Minutes 4  -CS      01039 - OT Self Care/Mgmt Minutes 6  -CS         Total Minutes    Timed Charges Total Minutes 10  -CS       Total Minutes 10  -CS                User Key  (r) = Recorded By, (t) = Taken By, (c) = Cosigned By      Initials Name Provider Type    CS Jignesh Quiroga, OT Occupational Therapist                  Therapy Charges for Today       Code Description Service Date Service Provider Modifiers Qty    [id number] HC OT SELF CARE/MGMT/TRAIN EA 15 MIN 7/20/2024 Jignesh Quiroga OT GO 1                 Jignesh Quiroga OT  7/20/2024

## 2024-07-21 NOTE — OUTREACH NOTE
Prep Survey      Flowsheet Row Responses   [facility] facility patient discharged from? [facility]   Is LACE score < 7 ? No   Eligibility Readm Mgmt   Discharge diagnosis Acute CVA (cerebrovascular accident)   Does the patient have one of the following disease processes/diagnoses(primary or secondary)? Stroke   Does the patient have Home health ordered? No   Is there a DME ordered? No   Prep survey completed? Yes            Alejandra MORRIS - Registered Nurse

## 2024-07-22 ENCOUNTER — TELEPHONE (OUTPATIENT)
Dept: NEUROLOGY | Facility: CLINIC | Age: 74
End: 2024-07-22
Payer: COMMERCIAL

## 2024-07-22 ENCOUNTER — READMISSION MANAGEMENT (OUTPATIENT)
Dept: CALL CENTER | Facility: HOSPITAL | Age: 74
End: 2024-07-22
Payer: COMMERCIAL

## 2024-07-22 NOTE — TELEPHONE ENCOUNTER
Caller: KENNETH HAGAN    Relationship to patient: Emergency Contact    Best call back number: [phone]    New or established patient?  [x] New  [] Established    Date of discharge: 7/20/2024    Facility discharged from:  [facility]    Diagnosis/Symptoms: ACUTE CVA    Length of stay (If applicable): 2 DAYS    Specialty Only: Did you see a [facility] provider?    [x] Yes  [] No  If so, who? ERICA STACY    DISCHARGE STATES FOLLOW UP AROUND 8/19/24. HUB SCHEDULED 1ST AVAILABLE, 9/16/24, AND ADDED TO WAIT LIST. PLEASE ADVISE IF THIS IS OK OR IF SOONER APPT IS NEEDED.     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

## 2024-07-22 NOTE — OUTREACH NOTE
Stroke Week 1 Survey      Flowsheet Row Responses   [facility] patient discharged from? [place]   Does the patient have one of the following disease processes/diagnoses(primary or secondary)? Stroke   Week 1 attempt successful? Yes   Call start time 1021   Call end time 1030   Discharge diagnosis Acute CVA (cerebrovascular accident)   Person spoke with today (if not patient) and relationship feng Rosario   Meds reviewed with patient/caregiver? Yes   Is the patient having any side effects they believe may be caused by any medication additions or changes? No   Does the patient have all medications ordered at discharge? No  [will  ASA, will hold on Lipitor until discussing with PCP, has had problems in the past with Lipitor, will discuss with PCP at next appt]   What is keeping the patient from filling the prescriptions? Script on hold per patient   Nursing Interventions No intervention needed   Is the patient taking all medications as directed (includes completed medication regime)? Yes   Does the patient have a primary care provider?  Yes   Does the patient have an appointment with their PCP within 7 days of discharge? Greater than 7 days   What is preventing the patient from scheduling follow up appointments within 7 days of discharge? Earlier appointment not available   Nursing Interventions Verified appointment date/time/provider   Has the patient kept scheduled appointments due by today? N/A   The Stroke Clinic at [facility] requests you follow up with them within 30 days for important follow up care. Please call [phone] to schedule this appointment. Thank you. Yes   Comments wife states has already planned on scheduling Stroke clinic appt   Has home health visited the patient within 72 hours of discharge? N/A   Psychosocial issues? No   Does the patient require any assistance with activities of daily living such as eating, bathing, dressing, walking, etc.? No   Does the patient  have any residual symptoms from stroke/TIA? No   Does the patient understand the diet ordered at discharge? Yes   Did the patient receive a copy of their discharge instructions? Yes   Nursing interventions Reviewed instructions with patient   What is the patient's perception of their health status since discharge? Improving   Nursing interventions Nurse provided patient education   Is the patient/caregiver able to teach back the risk factors for a stroke? High blood pressure-goal below 120/80, High Cholesterol, Physical inactivity and obesity, History of TIAs, Carotid or other artery disease   Is the patient/caregiver able to teach back signs and symptoms related to disease process for when to call PCP? Yes   Is the patient/caregiver able to teach back signs and symptoms related to disease process for when to call 911? Yes   If the patient is a current smoker, are they able to teach back resources for cessation? Not a smoker   Is the patient/caregiver able to teach back the hierarchy of who to call/visit for symptoms/problems? PCP, Specialist, Home health nurse, Urgent Care, ED, 911 Yes   Is the patient able to teach back FAST for Stroke? B alance: Watch for sudden loss of balance, E yes: Check for vision loss, F ace: Look for an uneven smile, A rm: Check if one arm is weak, S peech: Listen for slurred speech, T moose: Call 9-1-1 right away   Week 1 call completed? Yes   Call end time 1030            Valerie MORRIS - Registered Nurse

## 2024-07-23 LAB
QT INTERVAL: 476 MS
QTC INTERVAL: 471 MS

## 2024-07-23 NOTE — PAYOR COMM NOTE
"Auth# [id number]   24 Discharge Date  Discharge Summary    Utilization Review  Phone [phone]  Fax [phone]    [place] [id number]         Zeeshan Church (74 y.o. Male)       Date of Birth   1950    Social Security Number       Address   [address] [facility] [address]    Home Phone   [phone]    MRN   [id number]       Sikhism   None    Marital Status                               Admission Date   24    Admission Type   Urgent    Admitting Provider   Scott Cordova MD    Attending Provider       Department, Room/Bed   [facility] 2B ICU, N236/1       Discharge Date   2024    Discharge Disposition   Home or Self Care    Discharge Destination   Home                              Attending Provider: (none)   Allergies: Sulfate    Isolation: None   Infection: None   Code Status: Not on file    Ht: 170.2 cm (67.01\")   Wt: 94.2 kg (207 lb 10.8 oz)    Admission Cmt: None   Principal Problem: Acute CVA (cerebrovascular accident) [I63.9]                   Active Insurance as of 2024       Primary Coverage       Payor Plan Insurance Group Employer/Plan Group    ANTHEM BLUE CROSS ANTHEM BLUE CROSS BLUE SHIELD PPO [id number]       Payor Plan Address Payor Plan Phone Number Payor Plan Fax Number Effective Dates    [address] [phone]  2020 - None Entered    [place] [address]         Subscriber Name Subscriber Birth Date Member ID       ZEESHAN CHURCH 1950 [id number]                     Emergency Contacts        (Rel.) Home Phone Work Phone Mobile Phone    RED CHURCH (Spouse) [phone] -- --    KENNETH HAGAN (Daughter) [phone] -- --                 Discharge Summary        Case, Vicky LOZADA DO at 24 1314            [facility]   DISCHARGE SUMMARY    Patient Name: Zeeshan Church  : 1950  MRN: [id number]    Date of Admission: " 7/18/2024  Date of Discharge:  7/20/2024  Primary Care Physician: Josesito Trejo MD    Consults       Date and Time Order Name Status Description    7/18/2024  1:42 PM Inpatient Cardiology Consult Completed     7/18/2024  1:40 PM Inpatient Neurology Consult Stroke Completed             Hospital Course     Presenting Problem:   Acute CVA (cerebrovascular accident) [I63.9]      Active and Resolved Problems  Active Hospital Problems    Diagnosis  POA    **Acute CVA (cerebrovascular accident) [I63.9]  Unknown    Acute encephalopathy [G93.40]  Unknown      Resolved Hospital Problems   No resolved problems to display.         Hospital Course:  Pierre Church is a 74 y.o. male with diagnosis of prior CVA with a right PCA stroke in September 2023 at that time received thrombolytics which was complicated with hemorrhagic conversion, A-fib on Eliquis, MI, CABG, hypertension and dyslipidemia. Patient was brought to [place] emergency room due to concern about change in mental status. Family did not notice any seizure-like activity but due to confusion he was brought to the emergency room. Patient then developed left-sided weakness involving face arm and leg as well as facial droop and slurred speech. NIH stroke scale was 8 at that time. There were no CT capabilities so patient was transferred to [facility] for further evaluation and management. Patient on arrival underwent CT scan of the head, CT angiogram and CT perfusion. No definite evidence of CVA noted. CT perfusion study was normal. He states that he is back to his baseline now but does not recall what happened earlier today.     He was admitted to the ICU and started on empiric Keppra for possible seizure. EEG was negative and keppra was recommended to be discontinued at discharge. Stroke Neurology followed during the admission. CT imaging was negative for stroke. MRI could not be obtained. He was recommended to continue Eliquis and ASA. Lipitor was  added.     Echocardiogram was performed and showed cardiomyopathy which is a chronic finding. There was no definite shunt on bubble study.     He was resumed on his home Eliquis and ASA.     It was discussed with the patient and his wife that he may benefit from an outpatient sleep study as there is a risk of sleep apnea in associated with A-fib and recurrent strokes.     He was seen by PT/OT who recommended home with assistance.       Discharge Follow Up Recommendations for labs/diagnostics:   Follow up in Stroke Neurology Clinic at discharge.       Pertinent  and/or Most Recent Results     LAB RESULTS:          Lab 07/20/24 0450 07/19/24 0223 07/18/24  1115   WBC 5.25 5.94  --    HEMOGLOBIN 14.9 14.6  --    HEMATOCRIT 46.2 44.4  --    PLATELETS 137* 152  --    NEUTROS ABS 3.35 4.10  --    IMMATURE GRANS (ABS) 0.07* 0.05  --    LYMPHS ABS 1.16 1.11  --    MONOS ABS 0.54 0.58  --    EOS ABS 0.10 0.06  --    MCV 92.0 91.2  --    PROTIME  --   --  10.6   APTT  --   --  28.0         Lab 07/20/24  0450 07/19/24 0223 07/18/24  1732 07/18/24  1618 07/18/24  1115   SODIUM 140 141  --   --   --    POTASSIUM 3.8 3.9 3.8  --   --    CHLORIDE 104 105  --   --   --    CO2 28.0 27.0  --   --   --    ANION GAP 8.0 9.0  --   --   --    BUN 20 16  --   --   --    CREATININE 0.80 0.91  --   --   --    EGFR 94.0 89.5  --   --   --    GLUCOSE 94 118*  --   --   --    CALCIUM 8.8 8.8  --   --   --    MAGNESIUM 2.2 2.1  --   --  1.8   PHOSPHORUS  --  3.6  --   --   --    HEMOGLOBIN A1C  --  5.20  --   --   --    TSH  --   --   --  2.700  --          Lab 07/19/24 0223   TOTAL PROTEIN 6.0   ALBUMIN 3.6   GLOBULIN 2.4   ALT (SGPT) 22   AST (SGOT) 19   BILIRUBIN 0.7   ALK PHOS 44         Lab 07/18/24  1115   PROTIME 10.6   INR 1.03         Lab 07/19/24  0223   CHOLESTEROL 181   LDL CHOL 114*   HDL CHOL 29*   TRIGLYCERIDES 214*             Brief Urine Lab Results  (Last result in the past 365 days)        Color   Clarity   Blood   Leuk  Est   Nitrite   Protein   CREAT   Urine HCG        07/18/24 1600 Yellow   Clear   Negative   Negative   Negative   Negative                 Microbiology Results (last 10 days)       ** No results found for the last 240 hours. **            XR Chest 1 View    Result Date: 7/18/2024  Impression: Impression: 1.Evidence for atelectasis versus infiltrate in the left lung base. The findings may indicate left lower lobe pneumonia. Mild developing pulmonary edema could also be considered. Chronic changes could also have this appearance. Electronically Signed: Alexey Guillen MD  7/18/2024 4:45 PM EDT  Workstation ID: [id number]    EEG    Result Date: 7/18/2024  Impression: Normal study This report is transcribed using the Dragon dictation system.      CT Angiogram Head w AI Analysis of LVO    Result Date: 7/18/2024  Impression:  1. Patent bilateral carotid and vertebral arteries with no significant stenosis or dissection 2. No evidence of acute intracranial large vessel arterial occlusion  This report was finalized on 7/18/2024 2:46 PM by Ahsan Ramos.      CT Angiogram Neck    Result Date: 7/18/2024  Impression:  1. Patent bilateral carotid and vertebral arteries with no significant stenosis or dissection 2. No evidence of acute intracranial large vessel arterial occlusion  This report was finalized on 7/18/2024 2:46 PM by Ahsan Ramos.      CT CEREBRAL PERFUSION WITH & WITHOUT CONTRAST    Result Date: 7/18/2024  Impression: No evidence of ischemic brain tissue at risk or core infarct  This report was finalized on 7/18/2024 2:25 PM by Ahsan Ramos.      CT Head Without Contrast    Result Date: 7/18/2024  Impression:  1. No intracranial hemorrhage 2. Subacute to chronic right PCA distribution infarct  These results were communicated to the stroke team navigator at 1:25 p.m.  This report was finalized on 7/18/2024 1:25 PM by Ahsan Ramos.      CT Head Without Contrast    Result Date: 7/18/2024  Impression: No acute  intracranial process . Dr. Roe was notified at  7/18/2024 12:09 PM Images reviewed, interpreted, and dictated by Dr. GABBY Kerr. Transcribed by Maci Aguilar PA-C.             Results for orders placed during the hospital encounter of 07/18/24    Adult Transthoracic Echo Complete W/ Cont if Necessary Per Protocol (With Agitated Saline)    Interpretation Summary    Left ventricular ejection fraction appears to be 21 - 25%.    The left ventricular cavity is severely dilated.    Left ventricular wall thickness is consistent with moderate concentric hypertrophy.    There is a mitral valve ring present.  No significant mitral valve stenosis or regurgitation.    Moderate dilation of the aortic root is present.  Measures 4.5 cm    Technically difficult bubble study.  Appears negative for interatrial shunt    No evidence of left ventricular thrombus.        Discharge Details        Discharge Medications        New Medications        Instructions Start Date   aspirin 81 MG chewable tablet  Replaces: aspirin 81 MG EC tablet   81 mg, Oral, Daily   Start Date: July 21, 2024     atorvastatin 80 MG tablet  Commonly known as: LIPITOR   80 mg, Oral, Nightly             Continue These Medications        Instructions Start Date   amiodarone 200 MG tablet  Commonly known as: PACERONE   1 tablet, Oral, Daily      Eliquis 5 MG tablet tablet  Generic drug: apixaban   1 tablet, Oral, 2 Times Daily      Entresto 24-26 MG tablet  Generic drug: sacubitril-valsartan   1 tablet, Oral, 2 Times Daily      fluticasone 50 MCG/ACT nasal spray  Commonly known as: FLONASE   2 sprays, Nasal, Daily      furosemide 20 MG tablet  Commonly known as: LASIX   Take 1 tablet by mouth Daily.      levothyroxine 75 MCG tablet  Commonly known as: SYNTHROID, LEVOTHROID   75 mcg, Oral, Every Morning Before Breakfast      metoprolol tartrate 25 MG tablet  Commonly known as: LOPRESSOR   1 tablet, Oral, 2 Times Daily      montelukast 10 MG tablet  Commonly  known as: SINGULAIR   10 mg, Oral, 2 Times Daily             Stop These Medications      albuterol sulfate  (90 Base) MCG/ACT inhaler  Commonly known as: PROVENTIL HFA;VENTOLIN HFA;PROAIR HFA     aspirin 81 MG EC tablet  Replaced by: aspirin 81 MG chewable tablet     fish oil 1200 MG capsule capsule     ZyrTEC Allergy 10 MG capsule  Generic drug: Cetirizine HCl              Allergies   Allergen Reactions    Sulfate Rash         Discharge Disposition:  Home or Self Care    Diet:  Hospital:  Diet Order   Procedures    Diet: Regular/House, Cardiac; Healthy Heart (2-3 Na+); Texture: Regular (IDDSI 7); Fluid Consistency: Thin (IDDSI 0)         Discharge Activity: As tolerated.         CODE STATUS:    There are no questions and answers to display.         Future Appointments   Date Time Provider Department Center   10/11/2024 10:30 AM Nallely Paredes MD [facility]           Time spent on Discharge including face to face service:  31 minutes    Vicky Pickett DO    Electronically signed by Vicky Pickett DO at 07/20/24 1320       Discharge Order (From admission, onward)       Start     Ordered    07/20/24 1309  Discharge patient  Once        Expected Discharge Date: 07/20/24   Discharge Disposition: Home or Self Care   Physician of Record for Attribution - Please select from Treatment Team: PARMJIT TOPETE [[id number]]   Review needed by CMO to determine Physician of Record: No      Question Answer Comment   Physician of Record for Attribution - Please select from Treatment Team PARMJIT TOPETE    Review needed by CMO to determine Physician of Record No        07/20/24 7114

## 2024-07-24 ENCOUNTER — NURSE TRIAGE (OUTPATIENT)
Dept: CALL CENTER | Facility: HOSPITAL | Age: 74
End: 2024-07-24
Payer: COMMERCIAL

## 2024-07-24 NOTE — TELEPHONE ENCOUNTER
"Reason for Disposition   General information question, no triage required and triager able to answer question    Additional Information   Negative: [1] Caller is not with the adult (patient) AND [2] reporting urgent symptoms   Negative: Lab result questions   Negative: Medication questions   Negative: Caller can't be reached by phone   Negative: Caller has already spoken to PCP or another triager   Negative: RN needs further essential information from caller in order to complete triage   Negative: Requesting regular office appointment   Negative: [1] Caller requesting NON-URGENT health information AND [2] PCP's office is the best resource   Negative: Health Information question, no triage required and triager able to answer question    Answer Assessment - Initial Assessment Questions  1. REASON FOR CALL or QUESTION: \"What is your reason for calling today?\" or \"How can I best help you?\" or \"What question do you have that I can help answer?\"    Wanting to update Medicare information    Call transferred to  George for assistance    Protocols used: Information Only Call - No Triage-ADULT-    "

## 2024-09-17 ENCOUNTER — TELEPHONE (OUTPATIENT)
Dept: NEUROLOGY | Facility: CLINIC | Age: 74
End: 2024-09-17
Payer: COMMERCIAL

## 2025-01-07 RX ORDER — LEVOTHYROXINE SODIUM 75 UG/1
75 TABLET ORAL
Qty: 90 TABLET | Refills: 0 | OUTPATIENT
Start: 2025-01-07

## 2025-01-07 NOTE — TELEPHONE ENCOUNTER
Rx Refill Note  Requested Prescriptions     Pending Prescriptions Disp Refills    levothyroxine (SYNTHROID, LEVOTHROID) 75 MCG tablet [Pharmacy Med Name: LEVOTHYROXINE 75 MCG TABLET] 90 tablet 0     Sig: TAKE 1 TABLET BY MOUTH EVERY DAY BEFORE BREAKFAST      Last office visit with prescribing clinician: 6/21/2024     Next office visit with prescribing clinician: Patient will need to schedule an appointment for more refills        Ericka Hope  01/07/25, 13:47 EST

## 2025-07-19 ENCOUNTER — HOSPITAL ENCOUNTER (EMERGENCY)
Facility: HOSPITAL | Age: 75
Discharge: HOME | End: 2025-07-19
Attending: EMERGENCY MEDICINE
Payer: MEDICARE

## 2025-07-19 ENCOUNTER — APPOINTMENT (OUTPATIENT)
Facility: HOSPITAL | Age: 75
End: 2025-07-19
Payer: MEDICARE

## 2025-07-19 VITALS
RESPIRATION RATE: 18 BRPM | HEIGHT: 64 IN | DIASTOLIC BLOOD PRESSURE: 64 MMHG | OXYGEN SATURATION: 96 % | TEMPERATURE: 98.6 F | SYSTOLIC BLOOD PRESSURE: 124 MMHG | BODY MASS INDEX: 34.66 KG/M2 | HEART RATE: 73 BPM | WEIGHT: 203 LBS

## 2025-07-19 DIAGNOSIS — E86.0 DEHYDRATION: Primary | ICD-10-CM

## 2025-07-19 DIAGNOSIS — R10.31 GROIN PAIN, RIGHT: ICD-10-CM

## 2025-07-19 LAB
ALBUMIN SERPL-MCNC: 4.1 G/DL (ref 3.5–5.2)
ALBUMIN/GLOB SERPL: 1.6 G/DL
ALP SERPL-CCNC: 46 U/L (ref 39–117)
ALT SERPL W P-5'-P-CCNC: 23 U/L (ref 1–41)
ANION GAP SERPL CALCULATED.3IONS-SCNC: 13.9 MMOL/L (ref 5–15)
AST SERPL-CCNC: 18 U/L (ref 1–40)
BACTERIA UR QL AUTO: ABNORMAL /HPF
BASOPHILS # BLD AUTO: 0.03 10*3/MM3 (ref 0–0.2)
BASOPHILS NFR BLD AUTO: 0.4 % (ref 0–1.5)
BILIRUB SERPL-MCNC: 1 MG/DL (ref 0–1.2)
BILIRUB UR QL STRIP: NEGATIVE
BUN SERPL-MCNC: 23.4 MG/DL (ref 8–23)
BUN/CREAT SERPL: 24.9 (ref 7–25)
CALCIUM SPEC-SCNC: 9.5 MG/DL (ref 8.6–10.5)
CHLORIDE SERPL-SCNC: 101 MMOL/L (ref 98–107)
CK SERPL-CCNC: 60 U/L (ref 20–200)
CLARITY UR: CLEAR
CO2 SERPL-SCNC: 23.1 MMOL/L (ref 22–29)
COLOR UR: YELLOW
CREAT SERPL-MCNC: 0.94 MG/DL (ref 0.76–1.27)
DEPRECATED RDW RBC AUTO: 45.2 FL (ref 37–54)
EGFRCR SERPLBLD CKD-EPI 2021: 85.6 ML/MIN/1.73
EOSINOPHIL # BLD AUTO: 0.01 10*3/MM3 (ref 0–0.4)
EOSINOPHIL NFR BLD AUTO: 0.1 % (ref 0.3–6.2)
ERYTHROCYTE [DISTWIDTH] IN BLOOD BY AUTOMATED COUNT: 13.6 % (ref 12.3–15.4)
GLOBULIN UR ELPH-MCNC: 2.6 GM/DL
GLUCOSE SERPL-MCNC: 107 MG/DL (ref 65–99)
GLUCOSE UR STRIP-MCNC: NEGATIVE MG/DL
HCT VFR BLD AUTO: 50.9 % (ref 37.5–51)
HGB BLD-MCNC: 16.4 G/DL (ref 13–17.7)
HGB UR QL STRIP.AUTO: ABNORMAL
HYALINE CASTS UR QL AUTO: ABNORMAL /LPF
IMM GRANULOCYTES # BLD AUTO: 0.04 10*3/MM3 (ref 0–0.05)
IMM GRANULOCYTES NFR BLD AUTO: 0.5 % (ref 0–0.5)
KETONES UR QL STRIP: NEGATIVE
LEUKOCYTE ESTERASE UR QL STRIP.AUTO: NEGATIVE
LYMPHOCYTES # BLD AUTO: 0.42 10*3/MM3 (ref 0.7–3.1)
LYMPHOCYTES NFR BLD AUTO: 5.3 % (ref 19.6–45.3)
MCH RBC QN AUTO: 29 PG (ref 26.6–33)
MCHC RBC AUTO-ENTMCNC: 32.2 G/DL (ref 31.5–35.7)
MCV RBC AUTO: 89.9 FL (ref 79–97)
MONOCYTES # BLD AUTO: 0.41 10*3/MM3 (ref 0.1–0.9)
MONOCYTES NFR BLD AUTO: 5.2 % (ref 5–12)
NEUTROPHILS NFR BLD AUTO: 7.03 10*3/MM3 (ref 1.7–7)
NEUTROPHILS NFR BLD AUTO: 88.5 % (ref 42.7–76)
NITRITE UR QL STRIP: NEGATIVE
PH UR STRIP.AUTO: 6 [PH] (ref 5–8)
PLATELET # BLD AUTO: 130 10*3/MM3 (ref 140–450)
PMV BLD AUTO: 11.3 FL (ref 6–12)
POTASSIUM SERPL-SCNC: 3.9 MMOL/L (ref 3.5–5.2)
PROT SERPL-MCNC: 6.7 G/DL (ref 6–8.5)
PROT UR QL STRIP: NEGATIVE
RBC # BLD AUTO: 5.66 10*6/MM3 (ref 4.14–5.8)
RBC # UR STRIP: ABNORMAL /HPF
REF LAB TEST METHOD: ABNORMAL
SODIUM SERPL-SCNC: 138 MMOL/L (ref 136–145)
SP GR UR STRIP: 1.02 (ref 1–1.03)
SQUAMOUS #/AREA URNS HPF: ABNORMAL /HPF
UROBILINOGEN UR QL STRIP: ABNORMAL
WBC # UR STRIP: ABNORMAL /HPF
WBC NRBC COR # BLD AUTO: 7.94 10*3/MM3 (ref 3.4–10.8)

## 2025-07-19 PROCEDURE — 25810000003 SODIUM CHLORIDE 0.9 % SOLUTION: Performed by: EMERGENCY MEDICINE

## 2025-07-19 PROCEDURE — 74176 CT ABD & PELVIS W/O CONTRAST: CPT

## 2025-07-19 PROCEDURE — 81001 URINALYSIS AUTO W/SCOPE: CPT | Performed by: EMERGENCY MEDICINE

## 2025-07-19 PROCEDURE — 99284 EMERGENCY DEPT VISIT MOD MDM: CPT | Performed by: EMERGENCY MEDICINE

## 2025-07-19 PROCEDURE — 80053 COMPREHEN METABOLIC PANEL: CPT | Performed by: EMERGENCY MEDICINE

## 2025-07-19 PROCEDURE — 85025 COMPLETE CBC W/AUTO DIFF WBC: CPT | Performed by: EMERGENCY MEDICINE

## 2025-07-19 PROCEDURE — 82550 ASSAY OF CK (CPK): CPT | Performed by: EMERGENCY MEDICINE

## 2025-07-19 RX ORDER — CIPROFLOXACIN 500 MG/1
500 TABLET, FILM COATED ORAL 2 TIMES DAILY
Qty: 20 TABLET | Refills: 0 | Status: SHIPPED | OUTPATIENT
Start: 2025-07-19 | End: 2025-07-29

## 2025-07-19 RX ORDER — CIPROFLOXACIN 250 MG/1
500 TABLET, FILM COATED ORAL ONCE
Status: COMPLETED | OUTPATIENT
Start: 2025-07-19 | End: 2025-07-19

## 2025-07-19 RX ORDER — SODIUM CHLORIDE 0.9 % (FLUSH) 0.9 %
10 SYRINGE (ML) INJECTION AS NEEDED
Status: DISCONTINUED | OUTPATIENT
Start: 2025-07-19 | End: 2025-07-19 | Stop reason: HOSPADM

## 2025-07-19 RX ADMIN — CIPROFOLXACIN 500 MG: 250 TABLET ORAL at 21:07

## 2025-07-19 RX ADMIN — SODIUM CHLORIDE 1000 ML: 9 INJECTION, SOLUTION INTRAVENOUS at 19:46

## 2025-07-20 NOTE — [facility] PROVIDER NOTE
"Subjective  History of Present Illness:    Patient presents to the emergency department with decreased urine output, intermittent aching into his right groin and down to his testicle but none at this time.  Denies any fever or chills.  States he generally just does not feel well.  Denies any chest pain, shortness of breath.  Denies any significant dysuria.  States he has been urinating slightly less than normal      Nurses Notes reviewed and agree, including vitals, allergies, social history and prior medical history.     REVIEW OF SYSTEMS: All systems reviewed and not pertinent unless noted.  Review of Systems   Genitourinary:         Testicular aching   All other systems reviewed and are negative.      Past Medical History:   Diagnosis Date    Arthritis     CHF (congestive heart failure)     Coronary artery disease     Disease of thyroid gland     Elevated cholesterol     Hypertension     Stroke        Allergies:    Amiodarone, Latex, Amoxicillin-pot clavulanate, Clindamycin, Doxycycline, and Sulfate      Past Surgical History:   Procedure Laterality Date    CARDIAC CATHETERIZATION      CARDIAC SURGERY      CORONARY ARTERY BYPASS GRAFT      HERNIA REPAIR           Social History     Socioeconomic History    Marital status:    Tobacco Use    Smoking status: Never     Passive exposure: Never    Smokeless tobacco: Never   Vaping Use    Vaping status: Never Used   Substance and Sexual Activity    Alcohol use: Never    Drug use: Never    Sexual activity: Defer         No family history on file.    Objective  Physical Exam:  /70   Pulse 82   Temp 98.6 °F (37 °C) (Oral)   Resp 18   Ht 162.6 cm (64\")   Wt 92.1 kg (203 lb)   SpO2 98%   BMI 34.84 kg/m²      Physical Exam  Vitals and nursing note reviewed.   Constitutional:       General: He is not in acute distress.     Appearance: Normal appearance. He is normal weight. He is not ill-appearing, toxic-appearing or diaphoretic.   HENT:      Head: " Normocephalic and atraumatic.      Mouth/Throat:      Mouth: Mucous membranes are moist.   Eyes:      Extraocular Movements: Extraocular movements intact.      Conjunctiva/sclera: Conjunctivae normal.      Pupils: Pupils are equal, round, and reactive to light.   Cardiovascular:      Rate and Rhythm: Normal rate and regular rhythm.      Pulses: Normal pulses.      Heart sounds: Normal heart sounds.   Pulmonary:      Effort: Pulmonary effort is normal.      Breath sounds: Normal breath sounds.   Abdominal:      General: Abdomen is flat. Bowel sounds are normal.      Palpations: Abdomen is soft.   Musculoskeletal:         General: Normal range of motion.   Skin:     General: Skin is warm.      Capillary Refill: Capillary refill takes less than 2 seconds.   Neurological:      General: No focal deficit present.      Mental Status: He is alert and oriented to person, place, and time.   Psychiatric:         Mood and Affect: Mood normal.         Behavior: Behavior normal.         Thought Content: Thought content normal.         Judgment: Judgment normal.         Procedures    ED Course:         Lab Results (last 24 hours)       Procedure Component Value Units Date/Time    Urinalysis With Microscopic If Indicated (No Culture) - Urine, Clean Catch [[id number]]  (Abnormal) Collected: 07/19/25 1923    Specimen: Urine, Clean Catch Updated: 07/19/25 1934     Color, UA Yellow     Appearance, UA Clear     pH, UA 6.0     Specific Gravity, UA 1.020     Glucose, UA Negative     Ketones, UA Negative     Bilirubin, UA Negative     Blood, UA Trace     Protein, UA Negative     Leuk Esterase, UA Negative     Nitrite, UA Negative     Urobilinogen, UA 0.2 E.U./dL    Urinalysis, Microscopic Only - Urine, Clean Catch [[id number]]  (Abnormal) Collected: 07/19/25 1923    Specimen: Urine, Clean Catch Updated: 07/19/25 1945     RBC, UA None Seen /HPF      WBC, UA 0-2 /HPF      Bacteria, UA Trace /HPF      Squamous Epithelial Cells, UA 0-2 /HPF       Hyaline Casts, UA None Seen /LPF      Methodology Manual Light Microscopy    CBC & Differential [[id number]]  (Abnormal) Collected: 07/19/25 1946    Specimen: Blood Updated: 07/19/25 1954    Narrative:      The following orders were created for panel order CBC & Differential.  Procedure                               Abnormality         Status                     ---------                               -----------         ------                     CBC Auto Differential[[id number]]        Abnormal            Final result                 Please view results for these tests on the individual orders.    Comprehensive Metabolic Panel [[id number]]  (Abnormal) Collected: 07/19/25 1946    Specimen: Blood Updated: 07/19/25 2027     Glucose 107 mg/dL      BUN 23.4 mg/dL      Creatinine 0.94 mg/dL      Sodium 138 mmol/L      Potassium 3.9 mmol/L      Chloride 101 mmol/L      CO2 23.1 mmol/L      Calcium 9.5 mg/dL      Total Protein 6.7 g/dL      Albumin 4.1 g/dL      ALT (SGPT) 23 U/L      AST (SGOT) 18 U/L      Alkaline Phosphatase 46 U/L      Total Bilirubin 1.0 mg/dL      Globulin 2.6 gm/dL      A/G Ratio 1.6 g/dL      BUN/Creatinine Ratio 24.9     Anion Gap 13.9 mmol/L      eGFR 85.6 mL/min/1.73     Narrative:      GFR Categories in Chronic Kidney Disease (CKD)              GFR Category          GFR (mL/min/1.73)    Interpretation  G1                    90 or greater        Normal or high (1)  G2                    60-89                Mild decrease (1)  G3a                   45-59                Mild to moderate decrease  G3b                   30-44                Moderate to severe decrease  G4                    15-29                Severe decrease  G5                    14 or less           Kidney failure    (1)In the absence of evidence of kidney disease, neither GFR category G1 or G2 fulfill the criteria for CKD.    eGFR calculation 2021 CKD-EPI creatinine equation, which does not include race as a factor    CK  [[id number]]  (Normal) Collected: 07/19/25 1946    Specimen: Blood Updated: 07/19/25 2026     Creatine Kinase 60 U/L     CBC Auto Differential [[id number]]  (Abnormal) Collected: 07/19/25 1946    Specimen: Blood Updated: 07/19/25 1954     WBC 7.94 10*3/mm3      RBC 5.66 10*6/mm3      Hemoglobin 16.4 g/dL      Hematocrit 50.9 %      MCV 89.9 fL      MCH 29.0 pg      MCHC 32.2 g/dL      RDW 13.6 %      RDW-SD 45.2 fl      MPV 11.3 fL      Platelets 130 10*3/mm3      Neutrophil % 88.5 %      Lymphocyte % 5.3 %      Monocyte % 5.2 %      Eosinophil % 0.1 %      Basophil % 0.4 %      Immature Grans % 0.5 %      Neutrophils, Absolute 7.03 10*3/mm3      Lymphocytes, Absolute 0.42 10*3/mm3      Monocytes, Absolute 0.41 10*3/mm3      Eosinophils, Absolute 0.01 10*3/mm3      Basophils, Absolute 0.03 10*3/mm3      Immature Grans, Absolute 0.04 10*3/mm3              CT Abdomen Pelvis Stone Protocol  Result Date: 7/19/2025  CT ABDOMEN PELVIS STONE PROTOCOL Date of Exam: 7/19/2025 7:28 PM EDT Indication: lower pelvic pain, dysuria. Comparison: None available. Technique: Axial CT images were obtained of the abdomen and pelvis without the administration of contrast. Reconstructed coronal and sagittal images were also obtained. Automated exposure control and iterative construction methods were used. Findings: Liver: The liver is unremarkable in morphology. Evaluation for focal liver lesions is limited without IV contrast. No biliary dilation is seen. Gallbladder: Trace dependent hyperdensity within the gallbladder. Tiny gallstones not excluded. Pancreas: Unremarkable. Spleen: Unremarkable. Adrenal glands: Unremarkable. Genitourinary tract: Punctate nonobstructing calculi within both kidneys. Probable peripelvic cysts bilaterally. There is a 1.5 cm hypodense lesion at the lower pole of the left kidney, possibly a hemorrhagic/proteinaceous cyst. Exophytic cortical cyst at the upper pole of the left kidney. No hydronephrosis is seen.  Visualized portions of the ureters are unremarkable. Urinary bladder is decompressed which limits its evaluation. Bladder wall thickening may be related to underdistention or cystitis. Prostate gland is unremarkable. Gastrointestinal tract: Limited evaluation of the hollow viscera due to lack of IV contrast administration. Scattered fluid within the small and large bowel, nonspecific. Enteritis or diarrheal illness may be considered. Colonic diverticulosis is present. Appendix: No findings to suggest acute appendicitis. Other findings: No free air or free fluid is identified. No pathologically enlarged lymph nodes are seen. Vascular calcifications are present. Bones and soft tissues: No acute osseous lesion is identified. There are degenerative changes within the spine. There is grade 1 anterolisthesis at L4-L5. Superficial soft tissues demonstrate no acute abnormality. Surgical changes of the periumbilical abdominal wall with hernia mesh placement. Lung bases: The visualized lung bases are clear. Cardiac pacemaker leads. Mitral valvular prosthesis noted.     Impression: Impression: 1.Scattered fluid within the small and large bowel, nonspecific. Enteritis or diarrheal illness may be considered. 2.Urinary bladder is decompressed which limits its evaluation. Bladder wall thickening may be related to underdistention or cystitis. 3.Punctate nonobstructing calculi within both kidneys. Probable peripelvic cysts bilaterally. There is a 1.5 cm hypodense lesion at the lower pole of the left kidney, possibly a hemorrhagic/proteinaceous cyst. Consider further evaluation with renal ultrasound. 4.Additional findings as detailed above. Electronically Signed: Chetan Mitchell MD  7/19/2025 7:53 PM EDT  Workstation ID: [id number]         [facility]  Number of Diagnoses or Management Options  Diagnosis management comments: Patient was evaluated and workup was fairly benign except for dehydration.  I believe with his intermittent aching into  his testicle but none now this is likely a low-lying epididymitis versus orchitis.  The patient was treated with Cipro discharged home to follow-up with primary care physician and his urologist on an outpatient basis.  Has had prostatitis in the past but has been many years ago.        Medications   sodium chloride 0.9 % flush 10 mL (has no administration in time range)   ciprofloxacin (CIPRO) tablet 500 mg (has no administration in time range)   sodium chloride 0.9 % bolus 1,000 mL (1,000 mL Intravenous New Bag 7/19/25 1946)       Data interpreted: Nursing notes reviewed, vital signs reviewed.  Labs independently interpreted by me (CBC, CMP, lipase, UA, troponin, ABG, lactic acid, procalcitonin).  Imaging independently interpreted by me (x-ray, CT scan).  EKG independently interpreted by me.  O2 saturation:    Counseling: Discussed the results above with the patient regarding need for admission or discharge.  Patient understands and agrees plan of care.      -----  ED Disposition       ED Disposition   Discharge    Condition   Stable    Comment   --             Final diagnoses:   Dehydration   Groin pain, right      Your Follow-Up Providers       Kamila Granados PA-C. Call in 2 days.    Specialty: Physician Assistant  [address] [facility] [address] [phone]               [facility] EMERGENCY DEPART[facility] [place].    Specialty: Emergency Medicine  Follow up details: As needed  [address]  [facility] [address] [phone]                     Contact information for after-discharge care    Follow-up information has not been specified.                    Your medication list        START taking these medications        Instructions Last Dose Given Next Dose Due   ciprofloxacin 500 MG tablet  Commonly known as: CIPRO      Take 1 tablet by mouth 2 (Two) Times a Day for 10 days.              CONTINUE taking these medications        Instructions Last Dose  Given Next Dose Due   amiodarone 200 MG tablet  Commonly known as: PACERONE      Take 1 tablet by mouth Daily.       aspirin 81 MG chewable tablet      Chew 1 tablet Daily.       atorvastatin 80 MG tablet  Commonly known as: LIPITOR      Take 1 tablet by mouth Every Night.       Eliquis 5 MG tablet tablet  Generic drug: apixaban      Take 1 tablet by mouth 2 (Two) Times a Day.       Entresto 24-26 MG tablet  Generic drug: sacubitril-valsartan      Take 1 tablet by mouth 2 (Two) Times a Day.       fluticasone 50 MCG/ACT nasal spray  Commonly known as: FLONASE      2 sprays into the nostril(s) as directed by provider Daily.       furosemide 20 MG tablet  Commonly known as: LASIX      Take 1 tablet by mouth Daily.       levothyroxine 75 MCG tablet  Commonly known as: SYNTHROID, LEVOTHROID      Take 1 tablet by mouth Every Morning Before Breakfast.       metoprolol tartrate 25 MG tablet  Commonly known as: LOPRESSOR      Take 1 tablet by mouth 2 (Two) Times a Day.       montelukast 10 MG tablet  Commonly known as: SINGULAIR      Take 1 tablet by mouth 2 (Two) Times a Day.                 Where to Get Your Medications        These medications were sent to [facility] [id number] - [place] - [address] - [phone]  - [phone] FX  [address], [place] [address]      Phone: [phone]   ciprofloxacin 500 MG tablet          Statement Selected